# Patient Record
Sex: FEMALE | Race: WHITE | NOT HISPANIC OR LATINO | Employment: FULL TIME | URBAN - METROPOLITAN AREA
[De-identification: names, ages, dates, MRNs, and addresses within clinical notes are randomized per-mention and may not be internally consistent; named-entity substitution may affect disease eponyms.]

---

## 2023-12-14 ENCOUNTER — HOSPITAL ENCOUNTER (OUTPATIENT)
Dept: RADIOLOGY | Facility: HOSPITAL | Age: 48
Discharge: HOME | End: 2023-12-14
Payer: COMMERCIAL

## 2023-12-14 ENCOUNTER — OFFICE VISIT (OUTPATIENT)
Dept: PAIN MEDICINE | Facility: CLINIC | Age: 48
End: 2023-12-14
Payer: COMMERCIAL

## 2023-12-14 ENCOUNTER — LAB (OUTPATIENT)
Dept: LAB | Facility: LAB | Age: 48
End: 2023-12-14
Payer: COMMERCIAL

## 2023-12-14 VITALS
DIASTOLIC BLOOD PRESSURE: 85 MMHG | BODY MASS INDEX: 43.19 KG/M2 | HEART RATE: 91 BPM | RESPIRATION RATE: 16 BRPM | SYSTOLIC BLOOD PRESSURE: 195 MMHG | TEMPERATURE: 96.6 F | WEIGHT: 253 LBS | HEIGHT: 64 IN

## 2023-12-14 DIAGNOSIS — M54.16 LUMBAR RADICULOPATHY: ICD-10-CM

## 2023-12-14 DIAGNOSIS — E55.9 VITAMIN D DEFICIENCY, UNSPECIFIED: ICD-10-CM

## 2023-12-14 DIAGNOSIS — M54.2 CERVICALGIA: ICD-10-CM

## 2023-12-14 DIAGNOSIS — G89.4 CHRONIC PAIN SYNDROME: ICD-10-CM

## 2023-12-14 DIAGNOSIS — M17.10 ARTHRITIS OF KNEE: ICD-10-CM

## 2023-12-14 DIAGNOSIS — M54.2 CERVICALGIA: Primary | ICD-10-CM

## 2023-12-14 DIAGNOSIS — Z00.00 ENCOUNTER FOR GENERAL ADULT MEDICAL EXAMINATION WITHOUT ABNORMAL FINDINGS: Primary | ICD-10-CM

## 2023-12-14 LAB
25(OH)D3 SERPL-MCNC: 42 NG/ML (ref 30–100)
ALBUMIN SERPL BCP-MCNC: 4.1 G/DL (ref 3.4–5)
ALP SERPL-CCNC: 74 U/L (ref 33–110)
ALT SERPL W P-5'-P-CCNC: 18 U/L (ref 7–45)
ANION GAP SERPL CALC-SCNC: 12 MMOL/L (ref 10–20)
AST SERPL W P-5'-P-CCNC: 13 U/L (ref 9–39)
BASOPHILS # BLD AUTO: 0.03 X10*3/UL (ref 0–0.1)
BASOPHILS NFR BLD AUTO: 0.4 %
BILIRUB SERPL-MCNC: 0.3 MG/DL (ref 0–1.2)
BUN SERPL-MCNC: 17 MG/DL (ref 6–23)
CALCIUM SERPL-MCNC: 9.1 MG/DL (ref 8.6–10.3)
CHLORIDE SERPL-SCNC: 103 MMOL/L (ref 98–107)
CHOLEST SERPL-MCNC: 206 MG/DL (ref 0–199)
CHOLESTEROL/HDL RATIO: 3.5
CO2 SERPL-SCNC: 30 MMOL/L (ref 21–32)
CREAT SERPL-MCNC: 1.01 MG/DL (ref 0.5–1.05)
EOSINOPHIL # BLD AUTO: 0.29 X10*3/UL (ref 0–0.7)
EOSINOPHIL NFR BLD AUTO: 3.7 %
ERYTHROCYTE [DISTWIDTH] IN BLOOD BY AUTOMATED COUNT: 12.9 % (ref 11.5–14.5)
GFR SERPL CREATININE-BSD FRML MDRD: 69 ML/MIN/1.73M*2
GLUCOSE SERPL-MCNC: 81 MG/DL (ref 74–99)
HCT VFR BLD AUTO: 44.3 % (ref 36–46)
HDLC SERPL-MCNC: 59.6 MG/DL
HGB BLD-MCNC: 14.2 G/DL (ref 12–16)
IMM GRANULOCYTES # BLD AUTO: 0.04 X10*3/UL (ref 0–0.7)
IMM GRANULOCYTES NFR BLD AUTO: 0.5 % (ref 0–0.9)
LDLC SERPL CALC-MCNC: 112 MG/DL
LYMPHOCYTES # BLD AUTO: 2.41 X10*3/UL (ref 1.2–4.8)
LYMPHOCYTES NFR BLD AUTO: 30.4 %
MCH RBC QN AUTO: 29.6 PG (ref 26–34)
MCHC RBC AUTO-ENTMCNC: 32.1 G/DL (ref 32–36)
MCV RBC AUTO: 92 FL (ref 80–100)
MONOCYTES # BLD AUTO: 0.56 X10*3/UL (ref 0.1–1)
MONOCYTES NFR BLD AUTO: 7.1 %
NEUTROPHILS # BLD AUTO: 4.6 X10*3/UL (ref 1.2–7.7)
NEUTROPHILS NFR BLD AUTO: 57.9 %
NON HDL CHOLESTEROL: 146 MG/DL (ref 0–149)
NRBC BLD-RTO: 0 /100 WBCS (ref 0–0)
PLATELET # BLD AUTO: 364 X10*3/UL (ref 150–450)
POTASSIUM SERPL-SCNC: 4.2 MMOL/L (ref 3.5–5.3)
PROT SERPL-MCNC: 6.8 G/DL (ref 6.4–8.2)
RBC # BLD AUTO: 4.8 X10*6/UL (ref 4–5.2)
SODIUM SERPL-SCNC: 141 MMOL/L (ref 136–145)
TRIGL SERPL-MCNC: 172 MG/DL (ref 0–149)
TSH SERPL-ACNC: 1.83 MIU/L (ref 0.44–3.98)
VLDL: 34 MG/DL (ref 0–40)
WBC # BLD AUTO: 7.9 X10*3/UL (ref 4.4–11.3)

## 2023-12-14 PROCEDURE — 80061 LIPID PANEL: CPT

## 2023-12-14 PROCEDURE — 73564 X-RAY EXAM KNEE 4 OR MORE: CPT | Mod: LT

## 2023-12-14 PROCEDURE — 99214 OFFICE O/P EST MOD 30 MIN: CPT | Performed by: ANESTHESIOLOGY

## 2023-12-14 PROCEDURE — 84443 ASSAY THYROID STIM HORMONE: CPT

## 2023-12-14 PROCEDURE — 1036F TOBACCO NON-USER: CPT | Performed by: ANESTHESIOLOGY

## 2023-12-14 PROCEDURE — 85025 COMPLETE CBC W/AUTO DIFF WBC: CPT

## 2023-12-14 PROCEDURE — 36415 COLL VENOUS BLD VENIPUNCTURE: CPT

## 2023-12-14 PROCEDURE — 72114 X-RAY EXAM L-S SPINE BENDING: CPT | Performed by: RADIOLOGY

## 2023-12-14 PROCEDURE — 73564 X-RAY EXAM KNEE 4 OR MORE: CPT | Mod: LEFT SIDE | Performed by: RADIOLOGY

## 2023-12-14 PROCEDURE — 82306 VITAMIN D 25 HYDROXY: CPT

## 2023-12-14 PROCEDURE — 72114 X-RAY EXAM L-S SPINE BENDING: CPT | Mod: FY

## 2023-12-14 PROCEDURE — 80053 COMPREHEN METABOLIC PANEL: CPT

## 2023-12-14 RX ORDER — TOPIRAMATE 50 MG/1
TABLET, FILM COATED ORAL
COMMUNITY
Start: 2020-07-28 | End: 2024-01-10 | Stop reason: WASHOUT

## 2023-12-14 RX ORDER — DICLOFENAC SODIUM 50 MG/1
TABLET, DELAYED RELEASE ORAL
COMMUNITY
Start: 2021-02-05 | End: 2024-01-10 | Stop reason: WASHOUT

## 2023-12-14 RX ORDER — TOPIRAMATE 50 MG/1
50 TABLET, FILM COATED ORAL 2 TIMES DAILY
Qty: 180 TABLET | Refills: 4 | Status: SHIPPED | OUTPATIENT
Start: 2023-12-14 | End: 2023-12-20 | Stop reason: SDUPTHER

## 2023-12-14 RX ORDER — LORAZEPAM 2 MG/1
TABLET ORAL
COMMUNITY
Start: 2021-02-05 | End: 2023-12-21 | Stop reason: WASHOUT

## 2023-12-14 RX ORDER — MULTIVITAMIN/IRON/FOLIC ACID 18MG-0.4MG
TABLET ORAL
COMMUNITY
Start: 2020-07-28 | End: 2024-01-10 | Stop reason: WASHOUT

## 2023-12-14 RX ORDER — AMITRIPTYLINE HYDROCHLORIDE 10 MG/1
1 TABLET, FILM COATED ORAL NIGHTLY
COMMUNITY
Start: 2023-07-21 | End: 2024-01-10 | Stop reason: WASHOUT

## 2023-12-14 RX ORDER — ACETAMINOPHEN 160 MG/5ML
1 SUSPENSION, ORAL (FINAL DOSE FORM) ORAL 3 TIMES DAILY
COMMUNITY
Start: 2021-09-30 | End: 2024-01-10 | Stop reason: WASHOUT

## 2023-12-14 RX ORDER — HYDROCHLOROTHIAZIDE 12.5 MG/1
25 CAPSULE ORAL
COMMUNITY
Start: 2023-10-31 | End: 2024-01-15 | Stop reason: WASHOUT

## 2023-12-14 RX ORDER — GUAIFENESIN 600 MG/1
TABLET, EXTENDED RELEASE ORAL
COMMUNITY
Start: 2007-12-31 | End: 2024-01-10 | Stop reason: WASHOUT

## 2023-12-14 RX ORDER — PERPHENAZINE 16 MG
TABLET ORAL
Qty: 270 CAPSULE | Refills: 4 | Status: SHIPPED | OUTPATIENT
Start: 2023-12-14 | End: 2024-06-07 | Stop reason: ALTCHOICE

## 2023-12-14 RX ORDER — LISINOPRIL 10 MG/1
10 TABLET ORAL
COMMUNITY
Start: 2023-12-12 | End: 2024-01-15 | Stop reason: WASHOUT

## 2023-12-14 RX ORDER — VITAMIN B COMPLEX
1 CAPSULE ORAL 2 TIMES DAILY
Qty: 180 CAPSULE | Refills: 4 | Status: SHIPPED | OUTPATIENT
Start: 2023-12-14 | End: 2023-12-14

## 2023-12-14 RX ORDER — B-COMPLEX WITH VITAMIN C
TABLET ORAL
COMMUNITY
End: 2024-01-10 | Stop reason: WASHOUT

## 2023-12-14 RX ORDER — ACETAMINOPHEN 160 MG/5ML
200 SUSPENSION, ORAL (FINAL DOSE FORM) ORAL 3 TIMES DAILY
Qty: 270 CAPSULE | Refills: 4 | Status: SHIPPED | OUTPATIENT
Start: 2023-12-14 | End: 2023-12-20 | Stop reason: SDUPTHER

## 2023-12-14 RX ORDER — DICLOFENAC POTASSIUM 50 MG/1
50 TABLET, FILM COATED ORAL 3 TIMES DAILY
Qty: 270 TABLET | Refills: 4 | Status: SHIPPED | OUTPATIENT
Start: 2023-12-14 | End: 2024-01-04

## 2023-12-14 RX ORDER — VITAMIN B COMPLEX
1 CAPSULE ORAL 2 TIMES DAILY
Qty: 180 CAPSULE | Refills: 4 | Status: SHIPPED | OUTPATIENT
Start: 2023-12-14

## 2023-12-14 RX ORDER — ACETAMINOPHEN 160 MG/5ML
200 SUSPENSION, ORAL (FINAL DOSE FORM) ORAL 3 TIMES DAILY
COMMUNITY
Start: 2023-02-11 | End: 2024-01-10 | Stop reason: WASHOUT

## 2023-12-14 RX ORDER — ALBUTEROL SULFATE 90 UG/1
2 AEROSOL, METERED RESPIRATORY (INHALATION) EVERY 4 HOURS PRN
COMMUNITY
Start: 2007-08-02 | End: 2024-01-10 | Stop reason: WASHOUT

## 2023-12-14 RX ORDER — BUPROPION HYDROCHLORIDE 150 MG/1
450 TABLET ORAL
COMMUNITY
Start: 2023-05-02

## 2023-12-14 ASSESSMENT — ENCOUNTER SYMPTOMS
NEUROLOGICAL NEGATIVE: 1
HEMATOLOGIC/LYMPHATIC NEGATIVE: 1
PSYCHIATRIC NEGATIVE: 1
ENDOCRINE NEGATIVE: 1
ARTHRALGIAS: 1
RESPIRATORY NEGATIVE: 1
CARDIOVASCULAR NEGATIVE: 1
EYES NEGATIVE: 1
BACK PAIN: 1
GASTROINTESTINAL NEGATIVE: 1

## 2023-12-14 ASSESSMENT — PAIN SCALES - GENERAL
PAINLEVEL_OUTOF10: 6
PAINLEVEL: 6

## 2023-12-14 ASSESSMENT — PAIN - FUNCTIONAL ASSESSMENT: PAIN_FUNCTIONAL_ASSESSMENT: 0-10

## 2023-12-14 ASSESSMENT — PAIN DESCRIPTION - DESCRIPTORS: DESCRIPTORS: ACHING

## 2023-12-14 ASSESSMENT — LIFESTYLE VARIABLES: TOTAL SCORE: 2

## 2023-12-14 NOTE — PROGRESS NOTES
5/17/2023 addendum:  The knee x-ray showed mild arthritis in the knee which make her candidate for steroid injection if it does not work we will plan on Synvisc 1 injection.  Regarding the back she has spondylolisthesis at L4/L5 with some spondylosis and degenerative changes the patient may benefit from [place] angle L5-S1 epidural steroid injection    SUBJECTIVE:  This is 48 y.o.  female with PMH of morbid obesity BMI 43.4, KEVIN, hypertension, and fibromyalgia with multiple musculoskeletal complaints failed IV infusion therapy.  The patient is on Savella and cyclobenzaprine and doing very well with that never received a C6-7 interlaminar ROB who is here for follow-up now for left left leg pain does not know if it is knee or radiculopathy.  The patient has no x-rays for her knees or her back.  I ordered x-rays for her knees and her back.  The patient needs a refill on her medication and I refilled up her Savella 50 mg twice daily, topiramate 50 mg twice daily, diclofenac 50 mg with acetaminophen 1000 mg 3 times daily as needed, neuro supplements.  Will plan on knee or back injection according to the results of the x-rays.        Prior office visit:  10/8/2023: This is 45 year year old female who is back here today for follow-up stating that she is doing great on her Savella and supplement in addition to cyclobenzaprine. The patient could not afford the [facility] chiropractic and she had a contract with a chiropractor for a whole year she does treatments in addition she has medical massotherapy as well almost every week and she is very happy with the result as well. She is here just for her medication and supplement refill.         Last procedure:   IV infusion therapy the patient has had a 0% improvement in pain 0% functional improvement.     Portions of record reviewed for pertinent issues: active problem list, medication list, allergies, family history, social history, notes from last encounter, encounters, lab  results, imaging and other system records.     I have personally reviewed the OARRS report for this patient. This report is scanned into the electronic medical record. I have considered the risks of abuse, dependence, addiction and diversion. It showed: No controlled substance   showed no controlled substance  OPIOID RISK ASSESSMENT SCORE 3/26  Aberrant behavior: None        Employment/Diasbility: Full-time with a [employer]   Social Hx and education:  no children denies smoking drinking or use of illicit drugs, her  is musical , 2 years of college       Diagnostic studies:  7/28/2020 C-spine x-ray showed degenerative changes at C5-6        Review of Systems   HENT: Negative.     Eyes: Negative.    Respiratory: Negative.     Cardiovascular: Negative.    Gastrointestinal: Negative.    Endocrine: Negative.    Genitourinary: Negative.    Musculoskeletal:  Positive for arthralgias and back pain.        Left leg pain   Skin: Negative.    Neurological: Negative.    Hematological: Negative.    Psychiatric/Behavioral: Negative.        Physical Exam  Vitals and nursing note reviewed.   Constitutional:       Appearance: Normal appearance.   HENT:      Head: Normocephalic and atraumatic.      Nose: Nose normal.   Eyes:      Extraocular Movements: Extraocular movements intact.      Conjunctiva/sclera: Conjunctivae normal.      Pupils: Pupils are equal, round, and reactive to light.   Cardiovascular:      Rate and Rhythm: Normal rate and regular rhythm.      Pulses: Normal pulses.      Heart sounds: Normal heart sounds.   Pulmonary:      Effort: Pulmonary effort is normal.      Breath sounds: Normal breath sounds.   Abdominal:      General: Abdomen is flat. Bowel sounds are normal.      Palpations: Abdomen is soft.   Musculoskeletal:         General: Tenderness present.   Skin:     General: Skin is warm.   Neurological:      General: No focal deficit present.      Mental Status: She  is alert and oriented to person, place, and time.   Psychiatric:         Mood and Affect: Mood normal.         Behavior: Behavior normal.             Plan  At least 50% of the visit was involved in the discussion of the options for treatment. We discussed exercises, medication, interventional therapies and surgery. Healthy life style is essential with patient hard work to achieve the wellness. In addition; discussion with the patient and/or family about any of the diagnostic results, impressions and/or recommended diagnostic studies, prognosis, risks and benefits of treatment options, instructions for treatment and/or follow-up, importance of compliance with chosen treatment options, risk-factor reduction, and patient/family education.         Pool therapy, walking in the pool, at least 3x per week for 30 minutes  Continue self-directed physical therapy  Refill Savella 50 mg twice daily, topiramate 50 mg twice daily, diclofenac 50 mg with acetaminophen 1000 mg 3 times daily as needed, neuro supplements.  Left knee x-ray  Lumbar x-ray with flexion-extension  Healthy lifestyle and anti-inflammatory diet in addition to weight control discussed with the patient  Alternative chronic pain therapies was discussed, encouraged and information was handed  Return to Clinic after the x-rays     *Please note this report has been produced using speech recognition software and may contain errors related to that system including grammar, punctuation and spelling as well as words and phrases that may be inappropriate. If there are questions or concerns, please feel free to contact me to clarify.    Jose Lyons MD

## 2023-12-14 NOTE — PROGRESS NOTES
This is 48 y.o.  female with who has been treated for  fibromyalgia pain . Pain is better, The pain is described as achiness and is relieved by Medications patient takes Savella Topamax diclofenac   .Here for follow-up .  Chief Complaint   Patient presents with    Follow-up     Having left leg pain   However today the patient presents with left leg pain.  Started 6 weeks ago last 2 weeks it has been an bearable but she has also been out of her Topamax she is uncertain if the Two are related.    Pain Therapies:  medications    Opioid Risk Assessment Score 2/26 family history of alcohol abuse patient does have a history of depression.

## 2023-12-18 NOTE — PROGRESS NOTES
1/15/2024 addendum:  The patient had significant pain expressions during her lumbar epidural steroid injection at the bilateral L4-5.  The procedure was done successfully but I really do not think that the patient would respond to injections either since she failed the IV infusion therapy as well    SUBJECTIVE:  This is 48 y.o.  female with PMH of morbid obesity BMI 43.4, KEVIN, hypertension, and fibromyalgia with multiple musculoskeletal complaints failed IV infusion therapy.  The patient is on Savella 50 mg twice daily, topiramate 50 mg twice daily, diclofenac 50 mg with acetaminophen 1000 mg 3 times daily as needed in addition to a neuro supplement.  The patient had x-rays of her left knee x-ray showed moderate to severe medial compartment arthritis and lumbar x-ray showed degenerative changes at L4-5 and L5-S1 with spondylolisthesis L4/L5 stable on flexion extension may be she is candidate for L5-S1 shallow angle epidural steroid injection and steroid versus Synvisc injection of the knee  who is here for follow-up complaining mostly of her left knee pain in addition to her usual chronic lower back pain.  The patient had x-ray done on her knee showed significant decrease in the height of her medial compartment in addition to mild tricompartment arthritis.  We proceeded with injection in office today and refilled her medications      Prior office visit:  This is 48 y.o.  female with PMH of morbid obesity BMI 43.4, KEVIN, hypertension, and fibromyalgia with multiple musculoskeletal complaints failed IV infusion therapy.  The patient is on Savella and cyclobenzaprine and doing very well with that never received a C6-7 interlaminar ROB who is here for follow-up now for left left leg pain does not know if it is knee or radiculopathy.  The patient has no x-rays for her knees or her back.  I ordered x-rays for her knees and her back.  The patient needs a refill on her medication and I refilled up her Savella 50 mg twice  daily, topiramate 50 mg twice daily, diclofenac 50 mg with acetaminophen 1000 mg 3 times daily as needed, neuro supplements.  Will plan on knee or back injection according to the results of the x-rays.        Last procedure:   IV infusion therapy the patient has had a 0% improvement in pain 0% functional improvement.     Portions of record reviewed for pertinent issues: active problem list, medication list, allergies, family history, social history, notes from last encounter, encounters, lab results, imaging and other system records.     I have personally reviewed the OARRS report for this patient. This report is scanned into the electronic medical record. I have considered the risks of abuse, dependence, addiction and diversion. It showed: No controlled substance   showed no controlled substance  OPIOID RISK ASSESSMENT SCORE 3/26  Aberrant behavior: None        Employment/Diasbility: Full-time with a [employer]  Social Hx and education:  no children denies smoking drinking or use of illicit drugs, her  is musical , 2 years of college        Diagnostic studies:  12/14/2023 lumbar x-ray showed facet joint arthropathy L4-5 and L5-S1, Grade 1 L4/L5 anterolisthesis stable on flexion extension  12/14/2023 left knee x-ray showed moderate medial compartment compression and mild tricompartment arthritis  7/28/2020 C-spine x-ray showed degenerative changes at C5-6            Review of Systems   HENT: Negative.     Eyes: Negative.    Respiratory: Negative.     Cardiovascular: Negative.    Gastrointestinal: Negative.    Endocrine: Negative.    Genitourinary: Negative.    Musculoskeletal:  Positive for arthralgias and back pain.   Skin: Negative.    Neurological: Negative.    Hematological: Negative.    Psychiatric/Behavioral: Negative.        Physical Exam  Vitals and nursing note reviewed.   Constitutional:       Appearance: Normal appearance.   HENT:      Head: Normocephalic and  atraumatic.      Nose: Nose normal.   Eyes:      Extraocular Movements: Extraocular movements intact.      Conjunctiva/sclera: Conjunctivae normal.      Pupils: Pupils are equal, round, and reactive to light.   Cardiovascular:      Rate and Rhythm: Normal rate and regular rhythm.      Pulses: Normal pulses.      Heart sounds: Normal heart sounds.   Pulmonary:      Effort: Pulmonary effort is normal.      Breath sounds: Normal breath sounds.   Abdominal:      General: Abdomen is flat. Bowel sounds are normal.      Palpations: Abdomen is soft.   Musculoskeletal:         General: Tenderness present.   Skin:     General: Skin is warm.   Neurological:      General: No focal deficit present.      Mental Status: She is alert and oriented to person, place, and time.   Psychiatric:         Mood and Affect: Mood normal.         Behavior: Behavior normal.               Plan  At least 50% of the visit was involved in the discussion of the options for treatment. We discussed exercises, medication, interventional therapies and surgery. Healthy life style is essential with patient hard work to achieve the wellness. In addition; discussion with the patient and/or family about any of the diagnostic results, impressions and/or recommended diagnostic studies, prognosis, risks and benefits of treatment options, instructions for treatment and/or follow-up, importance of compliance with chosen treatment options, risk-factor reduction, and patient/family education.         Pool therapy, walking in the pool, at least 3x per week for 30 minutes  Continue self-directed physical therapy  Left knee steroid injection  Bilateral L5-S1 interlaminar ROB shallow angle  Healthy lifestyle and anti-inflammatory diet in addition to weight control discussed with the patient  Alternative chronic pain therapies was discussed, encouraged and information was handed  Return to Clinic 3 months     *Please note this report has been produced using speech  recognition software and may contain errors related to that system including grammar, punctuation and spelling as well as words and phrases that may be inappropriate. If there are questions or concerns, please feel free to contact me to clarify.      Procedure: Left knees injection.    Ready to learn, no apparent learning barriers.  Explained treatment plan. Pt. verbalized understanding. Following review of potential side effects and complications (including but not necessarily limited to infection, allergic reaction, local tissue breakdown, skin blanching, systemic side effects of corticosteroid's, elevation of blood glucose, injury to bodily tissues) they indicated they understood and agreed to proceed.    The procedure was carried out under sterile prep with Chlora-prep. The skin was infiltrated with lidocaine 1% using 25 ga 1.5 inche  needle was introduced and advanced into the lateral/caudal area of the patella into the ligament then the knee joint.Then the same needle 1.5 in needle was introduced at the same space, after negative aspiration a mixture of 3cc of 0.75% Bupivacaine and 40 mg of Kenalog  was distributed equally in the joint spaces.      Pt. Tolerated the procedure very well and had complete resolution of her symptoms.    Jose Lyons MD

## 2023-12-20 ENCOUNTER — OFFICE VISIT (OUTPATIENT)
Dept: PAIN MEDICINE | Facility: CLINIC | Age: 48
End: 2023-12-20
Payer: COMMERCIAL

## 2023-12-20 VITALS
SYSTOLIC BLOOD PRESSURE: 148 MMHG | TEMPERATURE: 96.4 F | DIASTOLIC BLOOD PRESSURE: 93 MMHG | HEIGHT: 64 IN | WEIGHT: 253.53 LBS | RESPIRATION RATE: 16 BRPM | BODY MASS INDEX: 43.28 KG/M2 | HEART RATE: 91 BPM

## 2023-12-20 DIAGNOSIS — M43.06 LUMBAR SPONDYLOLYSIS: Primary | ICD-10-CM

## 2023-12-20 DIAGNOSIS — M43.17 ACQUIRED SPONDYLOLISTHESIS OF LUMBOSACRAL REGION: ICD-10-CM

## 2023-12-20 DIAGNOSIS — M17.10 ARTHRITIS OF KNEE: ICD-10-CM

## 2023-12-20 DIAGNOSIS — G89.4 CHRONIC PAIN SYNDROME: ICD-10-CM

## 2023-12-20 DIAGNOSIS — M79.7 FIBROMYALGIA: ICD-10-CM

## 2023-12-20 DIAGNOSIS — M54.2 CERVICALGIA: ICD-10-CM

## 2023-12-20 DIAGNOSIS — M54.16 LUMBAR RADICULOPATHY: ICD-10-CM

## 2023-12-20 PROCEDURE — 1036F TOBACCO NON-USER: CPT | Performed by: ANESTHESIOLOGY

## 2023-12-20 PROCEDURE — 99214 OFFICE O/P EST MOD 30 MIN: CPT | Performed by: ANESTHESIOLOGY

## 2023-12-20 PROCEDURE — 2500000004 HC RX 250 GENERAL PHARMACY W/ HCPCS (ALT 636 FOR OP/ED): Performed by: ANESTHESIOLOGY

## 2023-12-20 PROCEDURE — 20610 DRAIN/INJ JOINT/BURSA W/O US: CPT | Performed by: ANESTHESIOLOGY

## 2023-12-20 PROCEDURE — 2500000005 HC RX 250 GENERAL PHARMACY W/O HCPCS: Performed by: ANESTHESIOLOGY

## 2023-12-20 RX ORDER — TOPIRAMATE 50 MG/1
50 TABLET, FILM COATED ORAL 2 TIMES DAILY
Qty: 180 TABLET | Refills: 4 | Status: SHIPPED | OUTPATIENT
Start: 2023-12-20 | End: 2024-01-10 | Stop reason: ALTCHOICE

## 2023-12-20 RX ORDER — PERPHENAZINE 16 MG
TABLET ORAL
Qty: 270 CAPSULE | Refills: 4 | Status: SHIPPED | OUTPATIENT
Start: 2023-12-20 | End: 2024-02-19 | Stop reason: SDUPTHER

## 2023-12-20 RX ORDER — ACETAMINOPHEN 160 MG/5ML
200 SUSPENSION, ORAL (FINAL DOSE FORM) ORAL 3 TIMES DAILY
Qty: 270 CAPSULE | Refills: 4 | Status: SHIPPED | OUTPATIENT
Start: 2023-12-20 | End: 2024-12-19

## 2023-12-20 RX ORDER — TRIAMCINOLONE ACETONIDE 40 MG/ML
40 INJECTION, SUSPENSION INTRA-ARTICULAR; INTRAMUSCULAR ONCE
Status: COMPLETED | OUTPATIENT
Start: 2023-12-20 | End: 2023-12-20

## 2023-12-20 RX ORDER — BUPIVACAINE HYDROCHLORIDE 7.5 MG/ML
5 INJECTION, SOLUTION EPIDURAL; RETROBULBAR ONCE
Status: COMPLETED | OUTPATIENT
Start: 2023-12-20 | End: 2023-12-20

## 2023-12-20 RX ADMIN — TRIAMCINOLONE ACETONIDE 40 MG: 40 INJECTION, SUSPENSION INTRA-ARTICULAR; INTRAMUSCULAR at 13:38

## 2023-12-20 RX ADMIN — BUPIVACAINE HYDROCHLORIDE 37.5 MG: 7.5 INJECTION, SOLUTION EPIDURAL; RETROBULBAR at 13:36

## 2023-12-20 ASSESSMENT — PATIENT HEALTH QUESTIONNAIRE - PHQ9
1. LITTLE INTEREST OR PLEASURE IN DOING THINGS: SEVERAL DAYS
SUM OF ALL RESPONSES TO PHQ9 QUESTIONS 1 AND 2: 2
2. FEELING DOWN, DEPRESSED OR HOPELESS: SEVERAL DAYS

## 2023-12-20 ASSESSMENT — PAIN SCALES - GENERAL
PAINLEVEL: 5
PAINLEVEL_OUTOF10: 5 - MODERATE PAIN

## 2023-12-20 ASSESSMENT — ENCOUNTER SYMPTOMS
LOSS OF SENSATION IN FEET: 1
OCCASIONAL FEELINGS OF UNSTEADINESS: 1
DEPRESSION: 1

## 2023-12-20 ASSESSMENT — PAIN - FUNCTIONAL ASSESSMENT: PAIN_FUNCTIONAL_ASSESSMENT: 0-10

## 2023-12-20 ASSESSMENT — PAIN DESCRIPTION - DESCRIPTORS: DESCRIPTORS: ACHING

## 2023-12-20 NOTE — PROGRESS NOTES
This is 48 y.o.  female with who has been treated for  Knee pain . Pain is unchanged, The pain is described as achiness and is relieved by nothing,  who is here for follow-up   Chief Complaint   Patient presents with    Follow-up    Knee Pain     5/10 L knee pain       Pain Therapies:  L knee X-ray follow up .

## 2023-12-21 ASSESSMENT — ENCOUNTER SYMPTOMS
CARDIOVASCULAR NEGATIVE: 1
ENDOCRINE NEGATIVE: 1
EYES NEGATIVE: 1
HEMATOLOGIC/LYMPHATIC NEGATIVE: 1
RESPIRATORY NEGATIVE: 1
PSYCHIATRIC NEGATIVE: 1
GASTROINTESTINAL NEGATIVE: 1
NEUROLOGICAL NEGATIVE: 1
BACK PAIN: 1
ARTHRALGIAS: 1

## 2023-12-30 DIAGNOSIS — G89.4 CHRONIC PAIN SYNDROME: ICD-10-CM

## 2023-12-30 DIAGNOSIS — M54.2 CERVICALGIA: ICD-10-CM

## 2023-12-30 DIAGNOSIS — M54.16 LUMBAR RADICULOPATHY: ICD-10-CM

## 2023-12-30 DIAGNOSIS — M17.10 ARTHRITIS OF KNEE: ICD-10-CM

## 2024-01-03 ENCOUNTER — TELEPHONE (OUTPATIENT)
Dept: PAIN MEDICINE | Facility: CLINIC | Age: 49
End: 2024-01-03

## 2024-01-04 RX ORDER — DICLOFENAC POTASSIUM 50 MG/1
TABLET, FILM COATED ORAL
Qty: 90 TABLET | Refills: 11 | Status: SHIPPED | OUTPATIENT
Start: 2024-01-04

## 2024-01-05 DIAGNOSIS — M54.16 LUMBAR RADICULOPATHY: Primary | ICD-10-CM

## 2024-01-05 NOTE — H&P (VIEW-ONLY)
Order for bilateral L5-S1 interlaminar ROB shallow angle to treat the stenosis and spondylolisthesis at L4-5

## 2024-01-10 ENCOUNTER — OFFICE VISIT (OUTPATIENT)
Dept: OBSTETRICS AND GYNECOLOGY | Facility: CLINIC | Age: 49
End: 2024-01-10
Payer: COMMERCIAL

## 2024-01-10 VITALS
WEIGHT: 252 LBS | SYSTOLIC BLOOD PRESSURE: 120 MMHG | DIASTOLIC BLOOD PRESSURE: 84 MMHG | BODY MASS INDEX: 43.02 KG/M2 | HEIGHT: 64 IN

## 2024-01-10 DIAGNOSIS — Z12.31 VISIT FOR SCREENING MAMMOGRAM: ICD-10-CM

## 2024-01-10 DIAGNOSIS — Z01.419 WELL FEMALE EXAM WITH ROUTINE GYNECOLOGICAL EXAM: ICD-10-CM

## 2024-01-10 PROCEDURE — 88175 CYTOPATH C/V AUTO FLUID REDO: CPT

## 2024-01-10 PROCEDURE — 87624 HPV HI-RISK TYP POOLED RSLT: CPT

## 2024-01-10 PROCEDURE — 1036F TOBACCO NON-USER: CPT | Performed by: OBSTETRICS & GYNECOLOGY

## 2024-01-10 PROCEDURE — 99396 PREV VISIT EST AGE 40-64: CPT | Performed by: OBSTETRICS & GYNECOLOGY

## 2024-01-10 RX ORDER — LISINOPRIL AND HYDROCHLOROTHIAZIDE 20; 25 MG/1; MG/1
TABLET ORAL
COMMUNITY
Start: 2023-12-26 | End: 2024-06-07 | Stop reason: SDUPTHER

## 2024-01-10 NOTE — PROGRESS NOTES
Subjective   Patient ID: Theodora Montez is a 48 y.o. female who presents for Annual Exam.    Last pap: 10/30/2020 normal HPV-  Last mamm: ordered for this year  LMP:  12/26/2023  Contraception: none  Sexually active: no  Self breast exam: yes  Diet: not very balanced  Exercise: does not exercise but walks a lot at work      HPI  Annual exam   Doing well. No complaints.       Review of Systems  Neg   Objective   Physical Exam    Physical Exam         Appearance: Normal appearance. Affect normal and alert  Pulmonary:      Effort: Pulmonary effort is normal. Breath sounds clear  Skin: no rashes or lesions   Breasts:     Breasts bilaterally are symmetrical. No masses or axillary adenopathy. No skin or nipple changes    Abdominal:     Abdomen is flat, soft, nontender. No distension. No mass palpated.      Genitourinary:     Labia: no skin lesions or rash       Urethra: No lesions.      Bladder with no prolapse     Vagina: No discharge, mucosa is pink with no lesions.     Cervix:    mobile and nontender no discharge     Uterus:   Not enlarged, small, nontender.      Adnexa: Bilaterally with  No mass or tenderness.            Extremities:  Nontender, no edema. Normal range of motion    Assessment/Plan   Diagnoses and all orders for this visit:  Well female exam with routine gynecological exam  Visit for screening mammogram    MD Margot Adams CMA 01/10/24 3:34 PM

## 2024-01-14 RX ORDER — PREDNISONE 10 MG/1
TABLET ORAL
Status: CANCELLED | OUTPATIENT
Start: 2024-01-14

## 2024-01-15 ENCOUNTER — APPOINTMENT (OUTPATIENT)
Dept: PAIN MEDICINE | Facility: CLINIC | Age: 49
End: 2024-01-15
Payer: COMMERCIAL

## 2024-01-15 ENCOUNTER — ANCILLARY PROCEDURE (OUTPATIENT)
Dept: RADIOLOGY | Facility: CLINIC | Age: 49
End: 2024-01-15
Payer: COMMERCIAL

## 2024-01-15 ENCOUNTER — HOSPITAL ENCOUNTER (OUTPATIENT)
Dept: PAIN MEDICINE | Facility: CLINIC | Age: 49
Discharge: HOME | End: 2024-01-15
Payer: COMMERCIAL

## 2024-01-15 VITALS
SYSTOLIC BLOOD PRESSURE: 148 MMHG | DIASTOLIC BLOOD PRESSURE: 87 MMHG | HEART RATE: 105 BPM | RESPIRATION RATE: 16 BRPM | TEMPERATURE: 95.7 F | OXYGEN SATURATION: 100 %

## 2024-01-15 DIAGNOSIS — M54.16 LUMBAR RADICULOPATHY: ICD-10-CM

## 2024-01-15 LAB — PREGNANCY TEST URINE, POC: NEGATIVE

## 2024-01-15 PROCEDURE — 77003 FLUOROGUIDE FOR SPINE INJECT: CPT

## 2024-01-15 PROCEDURE — 62323 NJX INTERLAMINAR LMBR/SAC: CPT | Performed by: ANESTHESIOLOGY

## 2024-01-15 PROCEDURE — A4216 STERILE WATER/SALINE, 10 ML: HCPCS

## 2024-01-15 PROCEDURE — 2500000005 HC RX 250 GENERAL PHARMACY W/O HCPCS

## 2024-01-15 PROCEDURE — 2500000004 HC RX 250 GENERAL PHARMACY W/ HCPCS (ALT 636 FOR OP/ED)

## 2024-01-15 RX ORDER — METHYLPREDNISOLONE ACETATE 40 MG/ML
INJECTION, SUSPENSION INTRA-ARTICULAR; INTRALESIONAL; INTRAMUSCULAR; SOFT TISSUE
Status: COMPLETED
Start: 2024-01-15 | End: 2024-01-15

## 2024-01-15 RX ORDER — AMITRIPTYLINE HYDROCHLORIDE 10 MG/1
10 TABLET, FILM COATED ORAL NIGHTLY
COMMUNITY

## 2024-01-15 RX ORDER — SODIUM CHLORIDE 9 MG/ML
INJECTION, SOLUTION INTRAMUSCULAR; INTRAVENOUS; SUBCUTANEOUS
Status: COMPLETED
Start: 2024-01-15 | End: 2024-01-15

## 2024-01-15 RX ORDER — LIDOCAINE HYDROCHLORIDE 5 MG/ML
INJECTION, SOLUTION INFILTRATION; INTRAVENOUS
Status: COMPLETED
Start: 2024-01-15 | End: 2024-01-15

## 2024-01-15 RX ADMIN — METHYLPREDNISOLONE ACETATE 40 MG: 40 INJECTION, SUSPENSION INTRA-ARTICULAR; INTRALESIONAL; INTRAMUSCULAR; INTRASYNOVIAL; SOFT TISSUE at 10:39

## 2024-01-15 RX ADMIN — SODIUM CHLORIDE 10 ML: 9 INJECTION, SOLUTION INTRAMUSCULAR; INTRAVENOUS; SUBCUTANEOUS at 10:40

## 2024-01-15 RX ADMIN — LIDOCAINE HYDROCHLORIDE 250 MG: 5 INJECTION, SOLUTION INFILTRATION at 10:41

## 2024-01-15 ASSESSMENT — COLUMBIA-SUICIDE SEVERITY RATING SCALE - C-SSRS
6. HAVE YOU EVER DONE ANYTHING, STARTED TO DO ANYTHING, OR PREPARED TO DO ANYTHING TO END YOUR LIFE?: NO
2. HAVE YOU ACTUALLY HAD ANY THOUGHTS OF KILLING YOURSELF?: NO
1. IN THE PAST MONTH, HAVE YOU WISHED YOU WERE DEAD OR WISHED YOU COULD GO TO SLEEP AND NOT WAKE UP?: NO

## 2024-01-15 ASSESSMENT — PAIN SCALES - GENERAL: PAINLEVEL_OUTOF10: 5 - MODERATE PAIN

## 2024-01-15 ASSESSMENT — PAIN - FUNCTIONAL ASSESSMENT: PAIN_FUNCTIONAL_ASSESSMENT: 0-10

## 2024-01-15 NOTE — Clinical Note
Prepped with ChloraPrep, a minimum of 3 minute dry time, longer if needed, no pooling noted, patient draped in sterile fashion.

## 2024-01-15 NOTE — OP NOTE
Patient has no changes in health medical management or allergies since last visit on 12/19/2023    Preop diagnosis: Lumbar spondylosis with radiculopathy  Postoperative diagnosis: The same    PROCEDURE bilateral L5-S1  Lumbar Interlaminar Epidural    Estimated blood loss: None  Complication: None     CLINICAL NOTE: The patient is a pleasant 48 y.o.  female with a significant history of bilateral lower back pain with claudication/radiculopathy.  Physical exam and radiological findings correlate with the pre-operative diagnoses.  Because of these findings we elected to proceed with transforaminal epidural steroid injection at the above level (s).      PROCEDURE: After sufficient consent was signed, the patient was brought to the Operating Room, and placed in a prone position with a pillow underneath the hip. The patient's lumbar spine was in a flexed position. The lumbar area was then prepped and draped in the usual fashion.     Under fluoroscopic guidance, the rightL5-S1 interspace was identified. The skin at the entry site was infiltrated with 1% Lidocaine using a size 25 gauge needle. A size Tomary annle 18-gauge, 5 inch  was introduced gradually until the inferior lamina of the interspace was encountered. The needle was gradually advanced until it entered into the ligament. At that time loss of resistance technique was used, and the needle advanced gradually until the negative pressure was noticed in the epidural space. The bevel of the needle was placed laterally, and 3 cc of Omnipaque 300 were injected. This revealed excellent distribution of the dye in the epidural space, and around the nerve root of the affected side of the lumbar spine. At that time, a mixture of Depo-Medrol 80 mg Lidocaine 30  mg and Normal Saline for a total volume of 10 cc were injected . The needle was flushed and removed.     The patient tolerated the procedure very well and was transferred to the Recovery Room in stable condition.  The  patient had stable resolution of symptoms.  Patient to follow-up in the Pain Management Clinic as scheduled.

## 2024-01-17 ENCOUNTER — TELEPHONE (OUTPATIENT)
Dept: INFUSION THERAPY | Facility: CLINIC | Age: 49
End: 2024-01-17
Payer: COMMERCIAL

## 2024-01-17 NOTE — TELEPHONE ENCOUNTER
[facility] asked to verify celebrex allergy and potential cross sensitivity with med diclofenac.  Patient returned call has been taking med diclofenac for 3 years with no issues.  Will forward info to [facility] via fax.

## 2024-01-24 LAB
CYTOLOGY CMNT CVX/VAG CYTO-IMP: NORMAL
HPV HR 12 DNA GENITAL QL NAA+PROBE: NEGATIVE
HPV HR GENOTYPES PNL CVX NAA+PROBE: NEGATIVE
HPV16 DNA SPEC QL NAA+PROBE: NEGATIVE
HPV18 DNA SPEC QL NAA+PROBE: NEGATIVE
LAB AP HPV GENOTYPE QUESTION: YES
LAB AP HPV HR: NORMAL
LABORATORY COMMENT REPORT: NORMAL
PATH REPORT.TOTAL CANCER: NORMAL

## 2024-02-19 ENCOUNTER — OFFICE VISIT (OUTPATIENT)
Dept: PRIMARY CARE | Facility: CLINIC | Age: 49
End: 2024-02-19
Payer: COMMERCIAL

## 2024-02-19 VITALS
WEIGHT: 243 LBS | HEIGHT: 64 IN | DIASTOLIC BLOOD PRESSURE: 72 MMHG | SYSTOLIC BLOOD PRESSURE: 106 MMHG | HEART RATE: 87 BPM | OXYGEN SATURATION: 98 % | BODY MASS INDEX: 41.48 KG/M2

## 2024-02-19 DIAGNOSIS — M25.562 CHRONIC PAIN OF LEFT KNEE: ICD-10-CM

## 2024-02-19 DIAGNOSIS — Z12.31 VISIT FOR SCREENING MAMMOGRAM: ICD-10-CM

## 2024-02-19 DIAGNOSIS — I10 PRIMARY HYPERTENSION: ICD-10-CM

## 2024-02-19 DIAGNOSIS — Z12.11 SCREENING FOR COLON CANCER: Primary | ICD-10-CM

## 2024-02-19 DIAGNOSIS — G89.29 CHRONIC PAIN OF LEFT KNEE: ICD-10-CM

## 2024-02-19 DIAGNOSIS — R20.2 PARESTHESIAS: ICD-10-CM

## 2024-02-19 DIAGNOSIS — Z00.00 HEALTHCARE MAINTENANCE: ICD-10-CM

## 2024-02-19 PROBLEM — E66.01 OBESITY, MORBID, BMI 40.0-49.9 (MULTI): Status: ACTIVE | Noted: 2024-02-19

## 2024-02-19 PROBLEM — M17.10 ARTHRITIS OF KNEE: Status: ACTIVE | Noted: 2024-02-19

## 2024-02-19 PROBLEM — Z86.69 HISTORY OF MIGRAINE: Status: RESOLVED | Noted: 2024-02-19 | Resolved: 2024-02-19

## 2024-02-19 PROBLEM — M43.00 SPONDYLOLYSIS: Status: ACTIVE | Noted: 2024-02-19

## 2024-02-19 PROBLEM — Z86.2 HISTORY OF BLOOD DISORDER: Status: RESOLVED | Noted: 2024-02-19 | Resolved: 2024-02-19

## 2024-02-19 PROBLEM — M43.28 ANKYLOSIS OF SACROILIAC JOINT: Status: ACTIVE | Noted: 2024-02-19

## 2024-02-19 PROBLEM — E66.01 MORBID OBESITY (MULTI): Status: ACTIVE | Noted: 2024-02-19

## 2024-02-19 PROBLEM — R63.2 POLYPHAGIA: Status: RESOLVED | Noted: 2024-02-19 | Resolved: 2024-02-19

## 2024-02-19 PROBLEM — B96.89 BACTERIAL VAGINOSIS: Status: RESOLVED | Noted: 2024-02-19 | Resolved: 2024-02-19

## 2024-02-19 PROBLEM — M99.03 LUMBOSACRAL DYSFUNCTION: Status: RESOLVED | Noted: 2024-02-19 | Resolved: 2024-02-19

## 2024-02-19 PROBLEM — J30.2 SEASONAL ALLERGIES: Status: ACTIVE | Noted: 2024-02-19

## 2024-02-19 PROBLEM — R79.89 ELEVATED SERUM CREATININE: Status: ACTIVE | Noted: 2024-02-19

## 2024-02-19 PROBLEM — J02.9 SORE THROAT: Status: RESOLVED | Noted: 2024-02-19 | Resolved: 2024-02-19
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
## 2024-02-19 PROBLEM — N95.1 HOT FLASH, MENOPAUSAL: Status: ACTIVE | Noted: 2024-02-19

## 2024-02-19 PROBLEM — M43.10 ACQUIRED SPONDYLOLISTHESIS: Status: RESOLVED | Noted: 2024-02-19 | Resolved: 2024-02-19

## 2024-02-19 PROBLEM — M99.09 SEGMENTAL AND SOMATIC DYSFUNCTION: Status: ACTIVE | Noted: 2024-02-19

## 2024-02-19 PROBLEM — M50.33 OTHER CERVICAL DISC DEGENERATION, CERVICOTHORACIC REGION: Status: ACTIVE | Noted: 2024-02-19

## 2024-02-19 PROBLEM — M54.2 NECK PAIN: Status: RESOLVED | Noted: 2024-02-19 | Resolved: 2024-02-19

## 2024-02-19 PROBLEM — N76.0 BACTERIAL VAGINOSIS: Status: RESOLVED | Noted: 2024-02-19 | Resolved: 2024-02-19

## 2024-02-19 PROBLEM — F33.40 RECURRENT MAJOR DEPRESSION IN REMISSION (CMS-HCC): Status: RESOLVED | Noted: 2021-11-11 | Resolved: 2024-02-19

## 2024-02-19 PROBLEM — M79.7 FIBROMYALGIA: Status: ACTIVE | Noted: 2021-11-11

## 2024-02-19 PROBLEM — R63.2 POLYPHAGIA: Status: ACTIVE | Noted: 2024-02-19

## 2024-02-19 PROBLEM — G47.33 OSA (OBSTRUCTIVE SLEEP APNEA): Status: ACTIVE | Noted: 2024-02-19

## 2024-02-19 PROBLEM — M99.01 CERVICOTHORACIC SOMATIC DYSFUNCTION: Status: ACTIVE | Noted: 2024-02-19

## 2024-02-19 PROBLEM — F32.A DEPRESSION: Status: ACTIVE | Noted: 2024-02-19

## 2024-02-19 PROBLEM — M54.6 THORACIC BACK PAIN: Status: ACTIVE | Noted: 2024-02-19

## 2024-02-19 PROBLEM — N94.6 DYSMENORRHEA: Status: ACTIVE | Noted: 2024-02-19

## 2024-02-19 PROBLEM — G89.4 CHRONIC PAIN SYNDROME: Status: ACTIVE | Noted: 2024-02-19

## 2024-02-19 PROBLEM — J06.9 ACUTE URI: Status: RESOLVED | Noted: 2024-02-19 | Resolved: 2024-02-19

## 2024-02-19 PROBLEM — G54.0 NEUROGENIC THORACIC OUTLET SYNDROME: Status: ACTIVE | Noted: 2024-02-19

## 2024-02-19 PROBLEM — R29.898 WEAKNESS OF BOTH LOWER EXTREMITIES: Status: ACTIVE | Noted: 2024-02-19

## 2024-02-19 PROCEDURE — 99204 OFFICE O/P NEW MOD 45 MIN: CPT | Performed by: INTERNAL MEDICINE

## 2024-02-19 PROCEDURE — 3074F SYST BP LT 130 MM HG: CPT | Performed by: INTERNAL MEDICINE

## 2024-02-19 PROCEDURE — 3078F DIAST BP <80 MM HG: CPT | Performed by: INTERNAL MEDICINE

## 2024-02-19 PROCEDURE — 1036F TOBACCO NON-USER: CPT | Performed by: INTERNAL MEDICINE

## 2024-02-19 RX ORDER — CETIRIZINE HYDROCHLORIDE 10 MG/1
TABLET ORAL
COMMUNITY

## 2024-02-19 RX ORDER — BUPROPION HYDROCHLORIDE 300 MG/1
300 TABLET ORAL DAILY
COMMUNITY

## 2024-02-19 RX ORDER — CHOLECALCIFEROL (VITAMIN D3) 50 MCG
2000 TABLET ORAL
COMMUNITY
Start: 2018-04-19

## 2024-02-19 NOTE — PROGRESS NOTES
1/15/2024 addendum:  The patient had significant pain expressions during her lumbar epidural steroid injection at the bilateral L4-5.  The procedure was done successfully but I really do not think that the patient would respond to injections either since she failed the IV infusion therapy as well       SUBJECTIVE:  This is 48 y.o.  female with PMH of morbid obesity BMI 43.4, KEVIN, hypertension, and fibromyalgia with multiple musculoskeletal complaints failed IV infusion therapy, The patient had significant pain expressions during her lumbar epidural steroid injection at the bilateral L4-5. The procedure was done successfully but I really do not think that the patient would respond to injections either since she failed the IV infusion therapy as well  who is here for follow-up ***      Prior office visit:  12/20/2023: This is 48 y.o.  female with PMH of morbid obesity BMI 43.4, KEVIN, hypertension, and fibromyalgia with multiple musculoskeletal complaints failed IV infusion therapy.  The patient is on Savella 50 mg twice daily, topiramate 50 mg twice daily, diclofenac 50 mg with acetaminophen 1000 mg 3 times daily as needed in addition to a neuro supplement.  The patient had x-rays of her left knee x-ray showed moderate to severe medial compartment arthritis and lumbar x-ray showed degenerative changes at L4-5 and L5-S1 with spondylolisthesis L4/L5 stable on flexion extension may be she is candidate for L5-S1 shallow angle epidural steroid injection and steroid versus Synvisc injection of the knee  who is here for follow-up complaining mostly of her left knee pain in addition to her usual chronic lower back pain.  The patient had x-ray done on her knee showed significant decrease in the height of her medial compartment in addition to mild tricompartment arthritis.  We proceeded with injection in office today and refilled her medications        Prior office visit:  This is 48 y.o.  female with PMH of morbid obesity BMI  43.4, KEVIN, hypertension, and fibromyalgia with multiple musculoskeletal complaints failed IV infusion therapy.  The patient is on Savella and cyclobenzaprine and doing very well with that never received a C6-7 interlaminar ROB who is here for follow-up now for left left leg pain does not know if it is knee or radiculopathy.  The patient has no x-rays for her knees or her back.  I ordered x-rays for her knees and her back.  The patient needs a refill on her medication and I refilled up her Savella 50 mg twice daily, topiramate 50 mg twice daily, diclofenac 50 mg with acetaminophen 1000 mg 3 times daily as needed, neuro supplements.  Will plan on knee or back injection according to the results of the x-rays.        Last procedure:   IV infusion therapy the patient has had a 0% improvement in pain 0% functional improvement.     Portions of record reviewed for pertinent issues: active problem list, medication list, allergies, family history, social history, notes from last encounter, encounters, lab results, imaging and other system records.     I have personally reviewed the OARRS report for this patient. This report is scanned into the electronic medical record. I have considered the risks of abuse, dependence, addiction and diversion. 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  Social Hx and education:  no children denies smoking drinking or use of illicit drugs, her  is musical , 2 years of college        Diagnostic studies:  12/14/2023 lumbar x-ray showed facet joint arthropathy L4-5 and L5-S1, Grade 1 L4/L5 anterolisthesis stable on flexion extension  12/14/2023 left knee x-ray showed moderate medial compartment compression and mild tricompartment arthritis  7/28/2020 C-spine x-ray showed degenerative changes at C5-6               Procedures:  *** the patient has had a ***% improvement in pain and function      Portions of record reviewed for pertinent issues: active problem list, medication list, allergies, family history, social history, notes from last encounter, encounters, lab results, imaging and other available records.      I have personally reviewed the OARRS report for this patient. This report is scanned into the electronic medical record. I have considered the risks of abuse, dependence, addiction and diversion. It showed: ***  OPIOID RISK ASSESSMENT SCORE ***/26  Opioid agreement: ***  Activities of daily living: ***  Adverse effects: ***  Analgesia: W/O: ***/10, W ***/10    Toxicology screen: ***  Aberrant behavior: ***        Review of Systems     Physical Exam                 Plan  At least 50% of the visit was involved in the discussion of the options for treatment. We discussed exercises, medication, interventional therapies and surgery. Healthy life style is essential with patient hard work to achieve the wellness. In addition; discussion with the patient and/or family about any of the diagnostic results, impressions and/or recommended diagnostic studies, prognosis, risks and benefits of treatment options, instructions for treatment and/or follow-up, importance of compliance with chosen treatment options, risk-factor reduction, and patient/family education.         Pool therapy, walking in the pool, at least 3x per week for 30 minutes  Continue self-directed physical therapy  *** Smoking cessation  Healthy lifestyle and anti-inflammatory diet in addition to weight control discussed with the patient  Alternative chronic pain therapies was discussed, encouraged and information was handed  Return to Clinic ***     *Please note this report has been produced using speech recognition software and may contain errors related to that system including grammar, punctuation and spelling as well as words and phrases that may be  inappropriate. If there are questions or concerns, please feel free to contact me to clarify.    Jose Lyons MD
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
## 2024-02-20 ENCOUNTER — OFFICE VISIT (OUTPATIENT)
Dept: ORTHOPEDIC SURGERY | Facility: CLINIC | Age: 49
End: 2024-02-20
Payer: COMMERCIAL

## 2024-02-20 ENCOUNTER — APPOINTMENT (OUTPATIENT)
Dept: PAIN MEDICINE | Facility: CLINIC | Age: 49
End: 2024-02-20
Payer: COMMERCIAL

## 2024-02-20 VITALS — WEIGHT: 243 LBS | BODY MASS INDEX: 41.48 KG/M2 | HEIGHT: 64 IN

## 2024-02-20 DIAGNOSIS — S83.242A TEAR OF MEDIAL MENISCUS OF LEFT KNEE, CURRENT, UNSPECIFIED TEAR TYPE, INITIAL ENCOUNTER: Primary | ICD-10-CM

## 2024-02-20 PROCEDURE — 1036F TOBACCO NON-USER: CPT | Performed by: ORTHOPAEDIC SURGERY

## 2024-02-20 PROCEDURE — 99203 OFFICE O/P NEW LOW 30 MIN: CPT | Performed by: ORTHOPAEDIC SURGERY

## 2024-02-20 ASSESSMENT — PAIN DESCRIPTION - DESCRIPTORS: DESCRIPTORS: DISCOMFORT;SORE;ACHING

## 2024-02-20 ASSESSMENT — PAIN SCALES - GENERAL: PAINLEVEL_OUTOF10: 3

## 2024-02-20 ASSESSMENT — PAIN - FUNCTIONAL ASSESSMENT: PAIN_FUNCTIONAL_ASSESSMENT: 0-10

## 2024-02-20 NOTE — PROGRESS NOTES
Chief complaint is medial left knee pain that is intermittent and sometimes severe it swells feels like something is locking or catching she has had a steroid injection with limited relief she is also treated with pain management for fibromyalgia  Her symptoms have been present for quite some time she has tried a home exercise program and she currently is on anti-inflammatory medications  Past medical,family and social histories have been reviewed and are up to date.  All other body systems have been reviewed and are negative for complaint.  Constitutional: Well-developed well-nourished   Eyes: Sclerae anicteric, pupils equal and round  HENT: Normocephalic atraumatic  Cardiovascular: Pulses full, regular rate and rhythm  Respiratory: Breathing not labored, no wheezing  Integumentary: Skin intact, no lesions or rashes  Neurological: Sensation intact, no gross strength deficits, reflexes equal  Psychiatric: Alert oriented and appropriate  Hematologic/lymphatic: No lymphadenopathy  Left knee: Tender medial joint line, small effusion, positive Nunes's, full range of motion, no instability  X-rays show mild degenerative changes assessment persistent mechanical symptoms is failed conservative therapy recommend MRI to assess meniscal pathology  Also recommended weight loss

## 2024-02-27 ENCOUNTER — APPOINTMENT (OUTPATIENT)
Dept: PAIN MEDICINE | Facility: CLINIC | Age: 49
End: 2024-02-27
Payer: COMMERCIAL

## 2024-02-29 ENCOUNTER — HOSPITAL ENCOUNTER (OUTPATIENT)
Dept: RADIOLOGY | Facility: CLINIC | Age: 49
Discharge: HOME | End: 2024-02-29
Payer: COMMERCIAL

## 2024-02-29 ENCOUNTER — OFFICE VISIT (OUTPATIENT)
Dept: PAIN MEDICINE | Facility: CLINIC | Age: 49
End: 2024-02-29
Payer: COMMERCIAL

## 2024-02-29 VITALS
DIASTOLIC BLOOD PRESSURE: 69 MMHG | WEIGHT: 242.4 LBS | HEART RATE: 92 BPM | RESPIRATION RATE: 14 BRPM | TEMPERATURE: 97.5 F | SYSTOLIC BLOOD PRESSURE: 114 MMHG | HEIGHT: 62 IN | BODY MASS INDEX: 44.61 KG/M2

## 2024-02-29 DIAGNOSIS — M43.16 SPONDYLOLISTHESIS AT L4-L5 LEVEL: Primary | ICD-10-CM

## 2024-02-29 DIAGNOSIS — M54.16 SPINAL STENOSIS OF LUMBAR REGION WITH RADICULOPATHY: ICD-10-CM

## 2024-02-29 DIAGNOSIS — M48.061 SPINAL STENOSIS OF LUMBAR REGION WITH RADICULOPATHY: ICD-10-CM

## 2024-02-29 DIAGNOSIS — Z12.31 VISIT FOR SCREENING MAMMOGRAM: ICD-10-CM

## 2024-02-29 PROCEDURE — 1036F TOBACCO NON-USER: CPT | Performed by: ANESTHESIOLOGY

## 2024-02-29 PROCEDURE — 77067 SCR MAMMO BI INCL CAD: CPT

## 2024-02-29 PROCEDURE — 3074F SYST BP LT 130 MM HG: CPT | Performed by: ANESTHESIOLOGY

## 2024-02-29 PROCEDURE — 77067 SCR MAMMO BI INCL CAD: CPT | Performed by: RADIOLOGY

## 2024-02-29 PROCEDURE — 99214 OFFICE O/P EST MOD 30 MIN: CPT | Performed by: ANESTHESIOLOGY

## 2024-02-29 PROCEDURE — 77063 BREAST TOMOSYNTHESIS BI: CPT | Performed by: RADIOLOGY

## 2024-02-29 PROCEDURE — 3078F DIAST BP <80 MM HG: CPT | Performed by: ANESTHESIOLOGY

## 2024-02-29 ASSESSMENT — PAIN SCALES - GENERAL: PAINLEVEL: 6

## 2024-02-29 ASSESSMENT — ENCOUNTER SYMPTOMS
OCCASIONAL FEELINGS OF UNSTEADINESS: 1
DEPRESSION: 1
LOSS OF SENSATION IN FEET: 1

## 2024-02-29 NOTE — PROGRESS NOTES
4/22/2024 addendum:  4/22/2024 MRI of the lumbar spine was performed did not show any bone marrow edema or endplate changes but there is significant facet hypertrophy in addition to synovial cyst from the left facet and moderate to severe spinal stenosis at the L4-5 plan for left L4-5 transfacet ROB:  FINDINGS:  Segmentation: Normal.      Conus: The lower thoracic cord appears unremarkable. The conus  terminates at the level of the superior endplate of L2. Cauda equina  are unremarkable.      Epidural fluid: None.      Alignment: Equivocal/trace retrolisthesis of L3 on L4. Similar grade  1 anterolisthesis of L4 on L5 estimated at 2-3 mm.      Vertebral bodies: Vertebral body heights are maintained.      Marrow signal: Mild, diffuse nonspecific marrow heterogeneity. No  suspicious STIR hyperintensity or marrow edema.      Intervertebral discs: Mild degenerative disc height loss at L3-L4 and  L4-L5 with disc desiccation.          Degenerative change:      T12-L1: Unremarkable.      L1-2: Unremarkable.      L2-3: Minimal facet arthropathy and disc bulge. No spinal canal  stenosis or neural foraminal narrowing.      L3-4: Mild facet arthropathy. Mild disc bulge with superimposed left  foraminal disc protrusion component. Narrowing of the  left-greater-than-right lateral recesses with no additional spinal  canal stenosis. There is mild-to-moderate left and mild right neural  foraminal narrowing.      L4-5: Moderate to severe bilateral facet arthropathy with facet joint  effusions and ligamentum flavum hypertrophy. There is a 7 mm diameter  left facet synovial cyst projecting inferiorly and medially from the  left facet joint and indenting the thecal sac (series 7, image 8).  There is disc uncovering/bulge with minimal endplate spurring. Mildly  prominent dorsal epidural fat as well. Combination of factors  contribute to moderate to severe spinal canal stenosis. Moderate  right and severe left neural foraminal  narrowing.      L5-S1: Mild facet arthropathy. No spinal canal stenosis or  substantial neural foraminal narrowing.      Soft tissues: The prevertebral and posterior paraspinal soft tissues  are unremarkable.      IMPRESSION:  Degenerative grade 1 anterolisthesis at L4-L5 with moderate to severe  facet arthropathy including bilateral facet joint effusions and a  left facet synovial cyst projecting into the spinal canal that in  conjunction with disc uncovering/bulge and prominent epidural fat  contribute to moderate to severe spinal canal stenosis. There is also  severe left/moderate right neural foraminal narrowing at this level.      Additional degenerative change at L3-L4 as above.      MACRO:  None      Signed by: Ming Johnson 4/22/2024 10:55 AM      SUBJECTIVE:  This is 48 y.o.  female with PMH of morbid obesity BMI 43.4, KEVIN, hypertension, and fibromyalgia with multiple musculoskeletal complaints failed IV infusion therapy.  The patient is on Savella 50 mg twice daily, topiramate 50 mg twice daily, diclofenac 50 mg with acetaminophen 1000 mg 3 times daily as needed in addition to a neuro supplement.  The patient had x-rays of her left knee x-ray showed moderate to severe medial compartment arthritis and lumbar x-ray showed degenerative changes at L4-5 and L5-S1 with spondylolisthesis L4/L5 stable on flexion extension received bilateral L4-5 interlaminar ROB who is here for follow-up stating that her primary care has left her and her new primary care has ordered an EMG on her which is I agree with him since that she has radicular symptoms correlate with the L5 radiculopathy.  The patient stated that the epidural steroid injection has helped tremendously in her lower back pain but the shooting pain in her leg now it is really bothering her.  She has her x-ray showed L4-5 spondylolisthesis which very possibly affecting the L5 nerve root.  The patient will benefit greatly from MRI to see the extent of the  stenosis at that level may be possibility of surgical intervention.  She is having MRI on her knee that ordered by orthopedics and hopefully we can have the MRI on her back at the same time.      Prior office visit:  12/20/2023: This is 48 y.o.  female with PMH of morbid obesity BMI 43.4, KEVIN, hypertension, and fibromyalgia with multiple musculoskeletal complaints failed IV infusion therapy.  The patient is on Savella 50 mg twice daily, topiramate 50 mg twice daily, diclofenac 50 mg with acetaminophen 1000 mg 3 times daily as needed in addition to a neuro supplement.  The patient had x-rays of her left knee x-ray showed moderate to severe medial compartment arthritis and lumbar x-ray showed degenerative changes at L4-5 and L5-S1 with spondylolisthesis L4/L5 stable on flexion extension may be she is candidate for L5-S1 shallow angle epidural steroid injection and steroid versus Synvisc injection of the knee  who is here for follow-up complaining mostly of her left knee pain in addition to her usual chronic lower back pain.  The patient had x-ray done on her knee showed significant decrease in the height of her medial compartment in addition to mild tricompartment arthritis.  We proceeded with injection in office today and refilled her medications           Last procedure:   1/15/2024 bilateral L5-S1 interlaminar ROB patient has had **% improvement in pain and function  IV infusion therapy the patient has had a 0% improvement in pain 0% functional improvement.     Portions of record reviewed for pertinent issues: active problem list, medication list, allergies, family history, social history, notes from last encounter, encounters, lab results, imaging and other system records.     I have personally reviewed the [facility] report for this patient. This report is scanned into the electronic medical record. I have considered the risks of abuse, dependence, addiction and diversion. It showed: No controlled substance   showed  no controlled substance  OPIOID RISK ASSESSMENT SCORE 3/26  Aberrant behavior: None        Employment/Diasbility: Full-time with a [employer]   Social Hx and education:  no children denies smoking drinking or use of illicit drugs, her  is musical , 2 years of college        Diagnostic studies:  12/14/2023 lumbar x-ray showed facet joint arthropathy L4-5 and L5-S1, Grade 1 L4/L5 anterolisthesis stable on flexion extension  12/14/2023 left knee x-ray showed moderate medial compartment compression and mild tricompartment arthritis  7/28/2020 C-spine x-ray showed degenerative changes at C5-6         Review of Systems   HENT: Negative.     Eyes: Negative.    Respiratory: Negative.     Cardiovascular: Negative.    Gastrointestinal: Negative.    Endocrine: Negative.    Genitourinary: Negative.    Musculoskeletal:  Positive for arthralgias and myalgias.   Skin: Negative.    Neurological: Negative.    Hematological: Negative.    Psychiatric/Behavioral: Negative.          Physical Exam  Vitals and nursing note reviewed.   Constitutional:       Appearance: Normal appearance.       HENT:      Head: Normocephalic and atraumatic.      Nose: Nose normal.   Eyes:      Extraocular Movements: Extraocular movements intact.      Conjunctiva/sclera: Conjunctivae normal.      Pupils: Pupils are equal, round, and reactive to light.   Cardiovascular:      Rate and Rhythm: Normal rate and regular rhythm.      Pulses: Normal pulses.      Heart sounds: Normal heart sounds.   Pulmonary:      Effort: Pulmonary effort is normal.      Breath sounds: Normal breath sounds.   Abdominal:      General: Abdomen is flat. Bowel sounds are normal.      Palpations: Abdomen is soft.   Musculoskeletal:         General: Tenderness present.   Skin:     General: Skin is warm.   Neurological:      General: No focal deficit present.      Mental Status: She is alert and oriented to person, place, and time.    Psychiatric:         Mood and Affect: Mood normal.         Behavior: Behavior normal.                      Plan  At least 50% of the visit was involved in the discussion of the options for treatment. We discussed exercises, medication, interventional therapies and surgery. Healthy life style is essential with patient hard work to achieve the wellness. In addition; discussion with the patient and/or family about any of the diagnostic results, impressions and/or recommended diagnostic studies, prognosis, risks and benefits of treatment options, instructions for treatment and/or follow-up, importance of compliance with chosen treatment options, risk-factor reduction, and patient/family education.         Pool therapy, walking in the pool, at least 3x per week for 30 minutes  Continue self-directed physical therapy  MRI of the lumbar spine showed significant stenosis at the L4-5 with left-sided facet synovial cyst because of the patient's symptoms I recommend left transfacet epidural steroid injection after lorazepam 2 mg  Healthy lifestyle and anti-inflammatory diet in addition to weight control discussed with the patient  Alternative chronic pain therapies was discussed, encouraged and information was handed  Return to Clinic after MRI is     *Please note this report has been produced using speech recognition software and may contain errors related to that system including grammar, punctuation and spelling as well as words and phrases that may be inappropriate. If there are questions or concerns, please feel free to contact me to clarify.    Jose Lyons MD

## 2024-02-29 NOTE — PROGRESS NOTES
This is 48 y.o.  female with who has been treated for  L leg pain  . Pain is worse, The pain is described as achiness, intermittent, sharp, and stabbing and is relieved by nothing with who is here for follow-up   Chief Complaint   Patient presents with    Pain     6/10 L groin pain to L shin       Pain Therapies:  Infusions  IV therapy the patient has had a 0% improvement in pain 0% functional improvement.

## 2024-03-01 ASSESSMENT — ENCOUNTER SYMPTOMS
ENDOCRINE NEGATIVE: 1
HEMATOLOGIC/LYMPHATIC NEGATIVE: 1
CARDIOVASCULAR NEGATIVE: 1
ARTHRALGIAS: 1
EYES NEGATIVE: 1
PSYCHIATRIC NEGATIVE: 1
RESPIRATORY NEGATIVE: 1
MYALGIAS: 1
NEUROLOGICAL NEGATIVE: 1
GASTROINTESTINAL NEGATIVE: 1

## 2024-03-07 DIAGNOSIS — N64.89 BREAST ASYMMETRY: Primary | ICD-10-CM

## 2024-03-19 ENCOUNTER — HOSPITAL ENCOUNTER (OUTPATIENT)
Dept: RADIOLOGY | Facility: CLINIC | Age: 49
Discharge: HOME | End: 2024-03-19
Payer: COMMERCIAL

## 2024-03-19 DIAGNOSIS — S83.242A TEAR OF MEDIAL MENISCUS OF LEFT KNEE, CURRENT, UNSPECIFIED TEAR TYPE, INITIAL ENCOUNTER: ICD-10-CM

## 2024-03-19 PROCEDURE — 73721 MRI JNT OF LWR EXTRE W/O DYE: CPT | Mod: LEFT SIDE | Performed by: RADIOLOGY

## 2024-03-19 PROCEDURE — 73721 MRI JNT OF LWR EXTRE W/O DYE: CPT | Mod: LT

## 2024-03-29 ENCOUNTER — OFFICE VISIT (OUTPATIENT)
Dept: ORTHOPEDIC SURGERY | Facility: CLINIC | Age: 49
End: 2024-03-29
Payer: COMMERCIAL

## 2024-03-29 VITALS — HEIGHT: 64 IN | WEIGHT: 238 LBS | BODY MASS INDEX: 40.63 KG/M2

## 2024-03-29 DIAGNOSIS — S83.242S OTHER TEAR OF MEDIAL MENISCUS OF LEFT KNEE AS CURRENT INJURY, SEQUELA: Primary | ICD-10-CM

## 2024-03-29 PROCEDURE — 99213 OFFICE O/P EST LOW 20 MIN: CPT | Performed by: ORTHOPAEDIC SURGERY

## 2024-03-29 PROCEDURE — 1036F TOBACCO NON-USER: CPT | Performed by: ORTHOPAEDIC SURGERY

## 2024-03-29 NOTE — PROGRESS NOTES
Here discussed her MRI which shows a significant medial arthrosis as well as a meniscal tear  She is currently having only intermittent locking catching symptoms and overall her symptoms are better she notes a cortisone injection was not that helpful  We discussed the complexity of treating a torn cartilage with existing arthritis of this severity and then really the only indications for mechanical type symptoms she has arthritis and will ultimately require knee replacement surgery we discussed conservative measures including weight loss exercise activity modification  At this point she does not elect to proceed with arthroscopic intervention and follow-up as needed  All questions were answered

## 2024-04-19 PROBLEM — R20.2 PARESTHESIA: Status: RESOLVED | Noted: 2024-04-19 | Resolved: 2024-04-19

## 2024-04-19 PROBLEM — S83.249A TEAR OF MEDIAL MENISCUS OF KNEE: Status: RESOLVED | Noted: 2024-04-19 | Resolved: 2024-04-19

## 2024-04-19 PROBLEM — M25.569 KNEE PAIN: Status: RESOLVED | Noted: 2024-04-19 | Resolved: 2024-04-19

## 2024-04-19 PROBLEM — E66.01 OBESITY, MORBID, BMI 40.0-49.9 (MULTI): Status: RESOLVED | Noted: 2024-02-19 | Resolved: 2024-04-19

## 2024-04-19 PROBLEM — M43.16 SPONDYLOLISTHESIS OF LUMBAR REGION: Status: ACTIVE | Noted: 2024-02-19

## 2024-04-19 RX ORDER — TOPIRAMATE 50 MG/1
TABLET, FILM COATED ORAL
COMMUNITY
Start: 2024-03-20

## 2024-04-22 ENCOUNTER — LAB (OUTPATIENT)
Dept: LAB | Facility: LAB | Age: 49
End: 2024-04-22
Payer: COMMERCIAL

## 2024-04-22 ENCOUNTER — HOSPITAL ENCOUNTER (OUTPATIENT)
Dept: RADIOLOGY | Facility: CLINIC | Age: 49
Discharge: HOME | End: 2024-04-22
Payer: COMMERCIAL

## 2024-04-22 ENCOUNTER — OFFICE VISIT (OUTPATIENT)
Dept: PRIMARY CARE | Facility: CLINIC | Age: 49
End: 2024-04-22
Payer: COMMERCIAL

## 2024-04-22 VITALS
BODY MASS INDEX: 40.63 KG/M2 | SYSTOLIC BLOOD PRESSURE: 126 MMHG | HEIGHT: 64 IN | WEIGHT: 238 LBS | DIASTOLIC BLOOD PRESSURE: 82 MMHG | OXYGEN SATURATION: 99 % | HEART RATE: 96 BPM

## 2024-04-22 DIAGNOSIS — Z00.00 HEALTHCARE MAINTENANCE: ICD-10-CM

## 2024-04-22 DIAGNOSIS — M43.16 SPONDYLOLISTHESIS AT L4-L5 LEVEL: ICD-10-CM

## 2024-04-22 DIAGNOSIS — E66.9 OBESITY, UNSPECIFIED CLASSIFICATION, UNSPECIFIED OBESITY TYPE, UNSPECIFIED WHETHER SERIOUS COMORBIDITY PRESENT: ICD-10-CM

## 2024-04-22 DIAGNOSIS — M48.061 SPINAL STENOSIS OF LUMBAR REGION, UNSPECIFIED WHETHER NEUROGENIC CLAUDICATION PRESENT: Primary | ICD-10-CM

## 2024-04-22 DIAGNOSIS — M54.16 SPINAL STENOSIS OF LUMBAR REGION WITH RADICULOPATHY: ICD-10-CM

## 2024-04-22 DIAGNOSIS — M48.061 SPINAL STENOSIS OF LUMBAR REGION WITH RADICULOPATHY: ICD-10-CM

## 2024-04-22 LAB
EST. AVERAGE GLUCOSE BLD GHB EST-MCNC: 82 MG/DL
HBA1C MFR BLD: 4.5 %

## 2024-04-22 PROCEDURE — 36415 COLL VENOUS BLD VENIPUNCTURE: CPT

## 2024-04-22 PROCEDURE — 3079F DIAST BP 80-89 MM HG: CPT | Performed by: INTERNAL MEDICINE

## 2024-04-22 PROCEDURE — 83036 HEMOGLOBIN GLYCOSYLATED A1C: CPT

## 2024-04-22 PROCEDURE — 1036F TOBACCO NON-USER: CPT | Performed by: INTERNAL MEDICINE

## 2024-04-22 PROCEDURE — 3074F SYST BP LT 130 MM HG: CPT | Performed by: INTERNAL MEDICINE

## 2024-04-22 PROCEDURE — 72148 MRI LUMBAR SPINE W/O DYE: CPT

## 2024-04-22 PROCEDURE — 99214 OFFICE O/P EST MOD 30 MIN: CPT | Performed by: INTERNAL MEDICINE

## 2024-04-22 PROCEDURE — 72148 MRI LUMBAR SPINE W/O DYE: CPT | Performed by: RADIOLOGY

## 2024-04-22 RX ORDER — LORAZEPAM 2 MG/1
TABLET ORAL
Qty: 1 TABLET | Refills: 0 | Status: SHIPPED | OUTPATIENT
Start: 2024-04-22 | End: 2024-06-07 | Stop reason: ALTCHOICE

## 2024-04-22 ASSESSMENT — PATIENT HEALTH QUESTIONNAIRE - PHQ9
1. LITTLE INTEREST OR PLEASURE IN DOING THINGS: NOT AT ALL
2. FEELING DOWN, DEPRESSED OR HOPELESS: NOT AT ALL
SUM OF ALL RESPONSES TO PHQ9 QUESTIONS 1 AND 2: 0
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
## 2024-05-14 ENCOUNTER — OFFICE VISIT (OUTPATIENT)
Dept: NEUROSURGERY | Facility: CLINIC | Age: 49
End: 2024-05-14
Payer: COMMERCIAL

## 2024-05-14 VITALS
DIASTOLIC BLOOD PRESSURE: 72 MMHG | TEMPERATURE: 97.1 F | BODY MASS INDEX: 39.27 KG/M2 | HEART RATE: 101 BPM | SYSTOLIC BLOOD PRESSURE: 122 MMHG | HEIGHT: 64 IN | WEIGHT: 230 LBS

## 2024-05-14 DIAGNOSIS — M54.12 CERVICAL RADICULOPATHY: ICD-10-CM

## 2024-05-14 DIAGNOSIS — M48.061 SPINAL STENOSIS OF LUMBAR REGION, UNSPECIFIED WHETHER NEUROGENIC CLAUDICATION PRESENT: ICD-10-CM

## 2024-05-14 DIAGNOSIS — M54.16 LUMBAR RADICULOPATHY: Primary | ICD-10-CM

## 2024-05-14 PROCEDURE — 3074F SYST BP LT 130 MM HG: CPT | Performed by: PHYSICIAN ASSISTANT

## 2024-05-14 PROCEDURE — 99204 OFFICE O/P NEW MOD 45 MIN: CPT | Performed by: PHYSICIAN ASSISTANT

## 2024-05-14 PROCEDURE — 3078F DIAST BP <80 MM HG: CPT | Performed by: PHYSICIAN ASSISTANT

## 2024-05-14 RX ORDER — GABAPENTIN 300 MG/1
300 CAPSULE ORAL 2 TIMES DAILY
Qty: 60 CAPSULE | Refills: 0 | Status: SHIPPED | OUTPATIENT
Start: 2024-05-14 | End: 2024-06-07 | Stop reason: ALTCHOICE

## 2024-05-14 ASSESSMENT — PATIENT HEALTH QUESTIONNAIRE - PHQ9
2. FEELING DOWN, DEPRESSED OR HOPELESS: SEVERAL DAYS
SUM OF ALL RESPONSES TO PHQ9 QUESTIONS 1 & 2: 1
10. IF YOU CHECKED OFF ANY PROBLEMS, HOW DIFFICULT HAVE THESE PROBLEMS MADE IT FOR YOU TO DO YOUR WORK, TAKE CARE OF THINGS AT HOME, OR GET ALONG WITH OTHER PEOPLE: SOMEWHAT DIFFICULT
1. LITTLE INTEREST OR PLEASURE IN DOING THINGS: NOT AT ALL

## 2024-05-14 ASSESSMENT — LIFESTYLE VARIABLES
SKIP TO QUESTIONS 9-10: 1
AUDIT-C TOTAL SCORE: 1
HOW MANY STANDARD DRINKS CONTAINING ALCOHOL DO YOU HAVE ON A TYPICAL DAY: 1 OR 2
HOW OFTEN DO YOU HAVE A DRINK CONTAINING ALCOHOL: MONTHLY OR LESS
HOW OFTEN DO YOU HAVE SIX OR MORE DRINKS ON ONE OCCASION: NEVER

## 2024-05-14 ASSESSMENT — PAIN SCALES - GENERAL: PAINLEVEL: 7
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
## 2024-05-24 ENCOUNTER — HOSPITAL ENCOUNTER (OUTPATIENT)
Dept: NEUROLOGY | Facility: HOSPITAL | Age: 49
Discharge: HOME | End: 2024-05-24
Payer: COMMERCIAL

## 2024-05-24 DIAGNOSIS — R20.2 PARESTHESIAS: ICD-10-CM

## 2024-05-24 PROCEDURE — 95886 MUSC TEST DONE W/N TEST COMP: CPT | Performed by: PSYCHIATRY & NEUROLOGY

## 2024-05-24 PROCEDURE — 95911 NRV CNDJ TEST 9-10 STUDIES: CPT | Performed by: PSYCHIATRY & NEUROLOGY

## 2024-06-07 ENCOUNTER — OFFICE VISIT (OUTPATIENT)
Dept: PRIMARY CARE | Facility: CLINIC | Age: 49
End: 2024-06-07
Payer: COMMERCIAL

## 2024-06-07 VITALS
BODY MASS INDEX: 41.48 KG/M2 | HEIGHT: 64 IN | WEIGHT: 243 LBS | DIASTOLIC BLOOD PRESSURE: 84 MMHG | SYSTOLIC BLOOD PRESSURE: 132 MMHG

## 2024-06-07 DIAGNOSIS — R20.2 PARESTHESIAS: ICD-10-CM

## 2024-06-07 DIAGNOSIS — I10 PRIMARY HYPERTENSION: Primary | ICD-10-CM

## 2024-06-07 DIAGNOSIS — M54.12 CERVICAL RADICULOPATHY: ICD-10-CM

## 2024-06-07 DIAGNOSIS — M54.16 LUMBAR RADICULOPATHY: ICD-10-CM

## 2024-06-07 DIAGNOSIS — E66.9 OBESITY, UNSPECIFIED CLASSIFICATION, UNSPECIFIED OBESITY TYPE, UNSPECIFIED WHETHER SERIOUS COMORBIDITY PRESENT: ICD-10-CM

## 2024-06-07 DIAGNOSIS — M25.512 LEFT SHOULDER PAIN, UNSPECIFIED CHRONICITY: ICD-10-CM

## 2024-06-07 PROCEDURE — 3075F SYST BP GE 130 - 139MM HG: CPT | Performed by: INTERNAL MEDICINE

## 2024-06-07 PROCEDURE — 3079F DIAST BP 80-89 MM HG: CPT | Performed by: INTERNAL MEDICINE

## 2024-06-07 PROCEDURE — 99214 OFFICE O/P EST MOD 30 MIN: CPT | Performed by: INTERNAL MEDICINE

## 2024-06-07 RX ORDER — LISINOPRIL AND HYDROCHLOROTHIAZIDE 20; 25 MG/1; MG/1
TABLET ORAL
Qty: 90 TABLET | Refills: 1 | Status: SHIPPED | OUTPATIENT
Start: 2024-06-07

## 2024-06-07 RX ORDER — GABAPENTIN 100 MG/1
100 CAPSULE ORAL 2 TIMES DAILY
Qty: 180 CAPSULE | Refills: 1 | Status: SHIPPED | OUTPATIENT
Start: 2024-06-07 | End: 2024-09-05
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
## 2024-06-11 ENCOUNTER — HOSPITAL ENCOUNTER (OUTPATIENT)
Dept: RADIOLOGY | Facility: CLINIC | Age: 49
Discharge: HOME | End: 2024-06-11
Payer: COMMERCIAL

## 2024-06-11 ENCOUNTER — APPOINTMENT (OUTPATIENT)
Dept: PRIMARY CARE | Facility: CLINIC | Age: 49
End: 2024-06-11
Payer: COMMERCIAL

## 2024-06-11 VITALS — HEIGHT: 62 IN | BODY MASS INDEX: 43.24 KG/M2 | WEIGHT: 235 LBS

## 2024-06-11 DIAGNOSIS — N64.89 BREAST ASYMMETRY: ICD-10-CM

## 2024-06-11 PROCEDURE — 76642 ULTRASOUND BREAST LIMITED: CPT | Mod: RT

## 2024-06-11 PROCEDURE — 77065 DX MAMMO INCL CAD UNI: CPT | Mod: RT

## 2024-06-11 PROCEDURE — 76983 USE EA ADDL TARGET LESION: CPT | Mod: RT

## 2024-06-17 DIAGNOSIS — M54.9 CHRONIC BACK PAIN, UNSPECIFIED BACK LOCATION, UNSPECIFIED BACK PAIN LATERALITY: Primary | ICD-10-CM

## 2024-06-17 DIAGNOSIS — G89.29 CHRONIC BACK PAIN, UNSPECIFIED BACK LOCATION, UNSPECIFIED BACK PAIN LATERALITY: Primary | ICD-10-CM

## 2024-06-24 ENCOUNTER — APPOINTMENT (OUTPATIENT)
Dept: PRIMARY CARE | Facility: CLINIC | Age: 49
End: 2024-06-24
Payer: COMMERCIAL

## 2024-06-26 DIAGNOSIS — Z12.31 VISIT FOR SCREENING MAMMOGRAM: Primary | ICD-10-CM

## 2024-07-17 ENCOUNTER — HOSPITAL ENCOUNTER (OUTPATIENT)
Dept: RADIOLOGY | Facility: CLINIC | Age: 49
Discharge: HOME | End: 2024-07-17
Payer: COMMERCIAL

## 2024-07-17 DIAGNOSIS — M25.512 LEFT SHOULDER PAIN, UNSPECIFIED CHRONICITY: ICD-10-CM

## 2024-07-17 DIAGNOSIS — M54.12 CERVICAL RADICULOPATHY: ICD-10-CM

## 2024-07-17 PROCEDURE — 73030 X-RAY EXAM OF SHOULDER: CPT | Mod: LT

## 2024-07-17 PROCEDURE — 73030 X-RAY EXAM OF SHOULDER: CPT | Mod: LEFT SIDE | Performed by: RADIOLOGY

## 2024-07-17 PROCEDURE — 72050 X-RAY EXAM NECK SPINE 4/5VWS: CPT | Performed by: RADIOLOGY

## 2024-07-17 PROCEDURE — 72050 X-RAY EXAM NECK SPINE 4/5VWS: CPT

## 2024-07-18 RX ORDER — GABAPENTIN 300 MG/1
CAPSULE ORAL
Refills: 0 | OUTPATIENT
Start: 2024-07-18

## 2024-07-19 ENCOUNTER — APPOINTMENT (OUTPATIENT)
Dept: PRIMARY CARE | Facility: CLINIC | Age: 49
End: 2024-07-19
Payer: COMMERCIAL

## 2024-07-19 VITALS — SYSTOLIC BLOOD PRESSURE: 93 MMHG | BODY MASS INDEX: 42.43 KG/M2 | WEIGHT: 232 LBS | DIASTOLIC BLOOD PRESSURE: 66 MMHG

## 2024-07-19 DIAGNOSIS — I10 PRIMARY HYPERTENSION: ICD-10-CM

## 2024-07-19 DIAGNOSIS — E66.9 OBESITY, UNSPECIFIED CLASSIFICATION, UNSPECIFIED OBESITY TYPE, UNSPECIFIED WHETHER SERIOUS COMORBIDITY PRESENT: ICD-10-CM

## 2024-07-19 DIAGNOSIS — F32.A DEPRESSION, UNSPECIFIED DEPRESSION TYPE: Primary | ICD-10-CM

## 2024-07-19 DIAGNOSIS — G89.29 CHRONIC LEFT SHOULDER PAIN: ICD-10-CM

## 2024-07-19 DIAGNOSIS — M25.512 CHRONIC LEFT SHOULDER PAIN: ICD-10-CM

## 2024-07-19 DIAGNOSIS — R20.2 PARESTHESIAS: ICD-10-CM

## 2024-07-19 PROCEDURE — 99215 OFFICE O/P EST HI 40 MIN: CPT | Performed by: INTERNAL MEDICINE

## 2024-07-19 PROCEDURE — 1036F TOBACCO NON-USER: CPT | Performed by: INTERNAL MEDICINE

## 2024-07-19 PROCEDURE — 3074F SYST BP LT 130 MM HG: CPT | Performed by: INTERNAL MEDICINE

## 2024-07-19 PROCEDURE — 3078F DIAST BP <80 MM HG: CPT | Performed by: INTERNAL MEDICINE

## 2024-07-19 RX ORDER — BUPROPION HYDROCHLORIDE 150 MG/1
150 TABLET ORAL EVERY MORNING
Qty: 90 TABLET | Refills: 1 | Status: SHIPPED | OUTPATIENT
Start: 2024-07-19

## 2024-07-19 RX ORDER — GABAPENTIN 100 MG/1
200 CAPSULE ORAL 3 TIMES DAILY
Qty: 270 CAPSULE | Refills: 1 | Status: SHIPPED | OUTPATIENT
Start: 2024-07-19 | End: 2024-10-17

## 2024-07-19 RX ORDER — LISINOPRIL 20 MG/1
20 TABLET ORAL DAILY
Qty: 90 TABLET | Refills: 1 | Status: SHIPPED | OUTPATIENT
Start: 2024-07-26 | End: 2024-10-24

## 2024-07-19 RX ORDER — LISINOPRIL 20 MG/1
20 TABLET ORAL DAILY
Qty: 90 TABLET | Refills: 1 | Status: SHIPPED | OUTPATIENT
Start: 2024-07-19 | End: 2024-07-19 | Stop reason: ALTCHOICE

## 2024-07-19 RX ORDER — BUPROPION HYDROCHLORIDE 300 MG/1
300 TABLET ORAL DAILY
Qty: 90 TABLET | Refills: 1 | Status: SHIPPED | OUTPATIENT
Start: 2024-07-19

## 2024-07-19 RX ORDER — LISINOPRIL 20 MG/1
20 TABLET ORAL DAILY
Qty: 30 TABLET | Refills: 0 | Status: SHIPPED | OUTPATIENT
Start: 2024-07-19 | End: 2024-08-18

## 2024-07-19 RX ORDER — TOPIRAMATE 50 MG/1
100 TABLET, FILM COATED ORAL DAILY
Start: 2024-07-19
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
## 2024-07-22 DIAGNOSIS — E66.9 OBESITY, UNSPECIFIED CLASSIFICATION, UNSPECIFIED OBESITY TYPE, UNSPECIFIED WHETHER SERIOUS COMORBIDITY PRESENT: ICD-10-CM

## 2024-07-22 DIAGNOSIS — M79.7 FIBROMYALGIA: Primary | ICD-10-CM

## 2024-07-22 RX ORDER — DICLOFENAC POTASSIUM 50 MG/1
TABLET, FILM COATED ORAL
Qty: 270 TABLET | Refills: 3 | Status: SHIPPED | OUTPATIENT
Start: 2024-07-22

## 2024-07-24 ENCOUNTER — APPOINTMENT (OUTPATIENT)
Dept: ORTHOPEDIC SURGERY | Facility: CLINIC | Age: 49
End: 2024-07-24
Payer: COMMERCIAL

## 2024-08-06 ENCOUNTER — APPOINTMENT (OUTPATIENT)
Dept: ORTHOPEDIC SURGERY | Facility: CLINIC | Age: 49
End: 2024-08-06
Payer: COMMERCIAL

## 2024-08-06 ENCOUNTER — LAB (OUTPATIENT)
Dept: LAB | Facility: LAB | Age: 49
End: 2024-08-06
Payer: COMMERCIAL

## 2024-08-06 ENCOUNTER — OFFICE VISIT (OUTPATIENT)
Dept: PRIMARY CARE | Facility: CLINIC | Age: 49
End: 2024-08-06
Payer: COMMERCIAL

## 2024-08-06 VITALS — DIASTOLIC BLOOD PRESSURE: 80 MMHG | SYSTOLIC BLOOD PRESSURE: 116 MMHG

## 2024-08-06 DIAGNOSIS — Z00.00 HEALTHCARE MAINTENANCE: ICD-10-CM

## 2024-08-06 DIAGNOSIS — G89.29 CHRONIC LEFT SHOULDER PAIN: ICD-10-CM

## 2024-08-06 DIAGNOSIS — L65.9 HAIR LOSS: ICD-10-CM

## 2024-08-06 DIAGNOSIS — I10 PRIMARY HYPERTENSION: ICD-10-CM

## 2024-08-06 DIAGNOSIS — L65.9 HAIR LOSS: Primary | ICD-10-CM

## 2024-08-06 DIAGNOSIS — M25.512 CHRONIC LEFT SHOULDER PAIN: ICD-10-CM

## 2024-08-06 DIAGNOSIS — F32.A DEPRESSION, UNSPECIFIED DEPRESSION TYPE: ICD-10-CM

## 2024-08-06 DIAGNOSIS — M75.02 ADHESIVE CAPSULITIS OF LEFT SHOULDER: ICD-10-CM

## 2024-08-06 LAB
25(OH)D3 SERPL-MCNC: 59 NG/ML (ref 30–100)
ALBUMIN SERPL BCP-MCNC: 4.4 G/DL (ref 3.4–5)
ALP SERPL-CCNC: 77 U/L (ref 33–110)
ALT SERPL W P-5'-P-CCNC: 25 U/L (ref 7–45)
ANION GAP SERPL CALC-SCNC: 15 MMOL/L (ref 10–20)
AST SERPL W P-5'-P-CCNC: 18 U/L (ref 9–39)
BASOPHILS # BLD AUTO: 0.05 X10*3/UL (ref 0–0.1)
BASOPHILS NFR BLD AUTO: 0.5 %
BILIRUB SERPL-MCNC: 0.4 MG/DL (ref 0–1.2)
BUN SERPL-MCNC: 17 MG/DL (ref 6–23)
CALCIUM SERPL-MCNC: 9.4 MG/DL (ref 8.6–10.6)
CCP IGG SERPL-ACNC: <1 U/ML
CHLORIDE SERPL-SCNC: 106 MMOL/L (ref 98–107)
CHOLEST SERPL-MCNC: 211 MG/DL (ref 0–199)
CHOLESTEROL/HDL RATIO: 3.8
CO2 SERPL-SCNC: 23 MMOL/L (ref 21–32)
CREAT SERPL-MCNC: 1.15 MG/DL (ref 0.5–1.05)
EGFRCR SERPLBLD CKD-EPI 2021: 59 ML/MIN/1.73M*2
EOSINOPHIL # BLD AUTO: 0.31 X10*3/UL (ref 0–0.7)
EOSINOPHIL NFR BLD AUTO: 3.1 %
ERYTHROCYTE [DISTWIDTH] IN BLOOD BY AUTOMATED COUNT: 14.3 % (ref 11.5–14.5)
EST. AVERAGE GLUCOSE BLD GHB EST-MCNC: 100 MG/DL
FSH SERPL-ACNC: 7.8 IU/L
GLUCOSE SERPL-MCNC: 82 MG/DL (ref 74–99)
HBA1C MFR BLD: 5.1 %
HCT VFR BLD AUTO: 43.3 % (ref 36–46)
HDLC SERPL-MCNC: 55.3 MG/DL
HGB BLD-MCNC: 13.6 G/DL (ref 12–16)
IMM GRANULOCYTES # BLD AUTO: 0.02 X10*3/UL (ref 0–0.7)
IMM GRANULOCYTES NFR BLD AUTO: 0.2 % (ref 0–0.9)
LDLC SERPL CALC-MCNC: 126 MG/DL
LH SERPL-ACNC: 10.1 IU/L
LYMPHOCYTES # BLD AUTO: 3.03 X10*3/UL (ref 1.2–4.8)
LYMPHOCYTES NFR BLD AUTO: 30.6 %
MCH RBC QN AUTO: 29.5 PG (ref 26–34)
MCHC RBC AUTO-ENTMCNC: 31.4 G/DL (ref 32–36)
MCV RBC AUTO: 94 FL (ref 80–100)
MONOCYTES # BLD AUTO: 0.55 X10*3/UL (ref 0.1–1)
MONOCYTES NFR BLD AUTO: 5.6 %
NEUTROPHILS # BLD AUTO: 5.94 X10*3/UL (ref 1.2–7.7)
NEUTROPHILS NFR BLD AUTO: 60 %
NON HDL CHOLESTEROL: 156 MG/DL (ref 0–149)
NRBC BLD-RTO: 0 /100 WBCS (ref 0–0)
PLATELET # BLD AUTO: 376 X10*3/UL (ref 150–450)
POTASSIUM SERPL-SCNC: 4.7 MMOL/L (ref 3.5–5.3)
PROT SERPL-MCNC: 7.1 G/DL (ref 6.4–8.2)
RBC # BLD AUTO: 4.61 X10*6/UL (ref 4–5.2)
RHEUMATOID FACT SER NEPH-ACNC: <10 IU/ML (ref 0–15)
SODIUM SERPL-SCNC: 139 MMOL/L (ref 136–145)
T4 FREE SERPL-MCNC: 1.21 NG/DL (ref 0.78–1.48)
TRIGL SERPL-MCNC: 149 MG/DL (ref 0–149)
TSH SERPL-ACNC: 2.85 MIU/L (ref 0.44–3.98)
VLDL: 30 MG/DL (ref 0–40)
WBC # BLD AUTO: 9.9 X10*3/UL (ref 4.4–11.3)

## 2024-08-06 PROCEDURE — 85025 COMPLETE CBC W/AUTO DIFF WBC: CPT

## 2024-08-06 PROCEDURE — 36415 COLL VENOUS BLD VENIPUNCTURE: CPT

## 2024-08-06 PROCEDURE — 84439 ASSAY OF FREE THYROXINE: CPT

## 2024-08-06 PROCEDURE — 80053 COMPREHEN METABOLIC PANEL: CPT

## 2024-08-06 PROCEDURE — 83001 ASSAY OF GONADOTROPIN (FSH): CPT

## 2024-08-06 PROCEDURE — 86200 CCP ANTIBODY: CPT

## 2024-08-06 PROCEDURE — 3074F SYST BP LT 130 MM HG: CPT | Performed by: INTERNAL MEDICINE

## 2024-08-06 PROCEDURE — 82672 ASSAY OF ESTROGEN: CPT

## 2024-08-06 PROCEDURE — 86431 RHEUMATOID FACTOR QUANT: CPT

## 2024-08-06 PROCEDURE — 99215 OFFICE O/P EST HI 40 MIN: CPT | Performed by: INTERNAL MEDICINE

## 2024-08-06 PROCEDURE — 1036F TOBACCO NON-USER: CPT | Performed by: INTERNAL MEDICINE

## 2024-08-06 PROCEDURE — 83002 ASSAY OF GONADOTROPIN (LH): CPT

## 2024-08-06 PROCEDURE — 80061 LIPID PANEL: CPT

## 2024-08-06 PROCEDURE — 84443 ASSAY THYROID STIM HORMONE: CPT

## 2024-08-06 PROCEDURE — 1036F TOBACCO NON-USER: CPT | Performed by: ORTHOPAEDIC SURGERY

## 2024-08-06 PROCEDURE — 99213 OFFICE O/P EST LOW 20 MIN: CPT | Performed by: ORTHOPAEDIC SURGERY

## 2024-08-06 PROCEDURE — 82306 VITAMIN D 25 HYDROXY: CPT

## 2024-08-06 PROCEDURE — 3079F DIAST BP 80-89 MM HG: CPT | Performed by: INTERNAL MEDICINE

## 2024-08-06 PROCEDURE — 83036 HEMOGLOBIN GLYCOSYLATED A1C: CPT

## 2024-08-06 PROCEDURE — 86038 ANTINUCLEAR ANTIBODIES: CPT

## 2024-08-06 RX ORDER — LISINOPRIL 20 MG/1
20 TABLET ORAL DAILY
Qty: 90 TABLET | Refills: 1 | Status: SHIPPED | OUTPATIENT
Start: 2024-08-06 | End: 2025-02-02
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
## 2024-08-06 NOTE — LETTER
August 7, 2024     Patient: Theodora Montez   YOB: 1975   Date of Visit: 8/6/2024       To Whom It May Concern:    Theodora Montez was seen in my clinic on 8/6/2024 at 9:00 am. Please excuse Theodora for her absence from work on this day to make the appointment.    If you have any questions or concerns, please don't hesitate to call.         Sincerely,         Jose Roberto Saxena MD        CC: No Recipients

## 2024-08-07 NOTE — PROGRESS NOTES
48-year-old is seen with left shoulder pain that she has had for 2 months.  She has been having persistent moderate throbbing pain and loss of range of motion.  She is right-hand dominant.  She takes gabapentin primarily for her back.  She is also taken ibuprofen and used topical ointments and lidocaine patch.  She has had chiropractic treatments and massage therapy.  She has fibromyalgia.  She is a .    Pleasant and no acute distress.  Left shoulder forward flexion 140°, abduction 60 degrees, external rotation 5 degrees, internal rotation to her side. No effusion or instability. There is positive Neer and Diaz impingement. There is subacromial crepitus and tenderness around the subacromial space. No biceps tenderness. No acromioclavicular tenderness. Rotator cuff strength is intact but there is discomfort with strength testing. Right shoulder forward flexion 180°. No effusion or instability. No impingement or crepitus or tenderness involving the subacromial space. No biceps or acromioclavicular tenderness. There is intact rotator cuff strength. There is adequate range of motion of the cervical spine without pain. Both upper extremities are well perfused skin is intact and muscle tone is adequate. Elbow flexion and extension and wrist flexion and extension strength are intact.    Multiple x-ray views of the left shoulder are personally reviewed and there is no acute bony abnormality.    A discussion about adhesive capsulitis was done.  Treatment options were reviewed.  She will perform physical therapy.  She will continue with her medication regimen.  The fact that there is a role for gabapentin and adhesive capsulitis was discussed.  Cortisone injection could be considered.  She has an MRI scheduled next week.  If she does not improve with conservative treatment then manipulation under anesthesia and arthroscopy with capsular release could be a consideration.

## 2024-08-09 LAB — ANA SER QL HEP2 SUBST: NEGATIVE

## 2024-08-10 DIAGNOSIS — I10 PRIMARY HYPERTENSION: ICD-10-CM

## 2024-08-12 ENCOUNTER — HOSPITAL ENCOUNTER (OUTPATIENT)
Dept: RADIOLOGY | Facility: CLINIC | Age: 49
Discharge: HOME | End: 2024-08-12
Payer: COMMERCIAL

## 2024-08-12 DIAGNOSIS — G89.29 CHRONIC LEFT SHOULDER PAIN: ICD-10-CM

## 2024-08-12 DIAGNOSIS — M25.512 CHRONIC LEFT SHOULDER PAIN: ICD-10-CM

## 2024-08-12 PROCEDURE — 73221 MRI JOINT UPR EXTREM W/O DYE: CPT | Mod: LEFT SIDE | Performed by: RADIOLOGY

## 2024-08-12 PROCEDURE — 73221 MRI JOINT UPR EXTREM W/O DYE: CPT | Mod: LT

## 2024-08-13 ENCOUNTER — TELEPHONE (OUTPATIENT)
Dept: ORTHOPEDIC SURGERY | Facility: CLINIC | Age: 49
End: 2024-08-13

## 2024-08-13 ENCOUNTER — APPOINTMENT (OUTPATIENT)
Dept: PRIMARY CARE | Facility: CLINIC | Age: 49
End: 2024-08-13
Payer: COMMERCIAL

## 2024-08-13 LAB — ESTROGEN SERPL-MCNC: 802 PG/ML

## 2024-08-13 RX ORDER — LISINOPRIL 20 MG/1
20 TABLET ORAL DAILY
Qty: 90 TABLET | Refills: 1 | Status: SHIPPED | OUTPATIENT
Start: 2024-08-13

## 2024-08-13 NOTE — TELEPHONE ENCOUNTER
Patient states she was to let you know when her MRI results were in for her shoulder. MRI was ordered by her PCP. She was originally tod at her appointment with you to continue PT and follow up in a few months if not improving. Thanks Florinda

## 2024-08-14 DIAGNOSIS — N18.30 STAGE 3 CHRONIC KIDNEY DISEASE, UNSPECIFIED WHETHER STAGE 3A OR 3B CKD (MULTI): ICD-10-CM

## 2024-08-14 DIAGNOSIS — E78.5 HYPERLIPIDEMIA, UNSPECIFIED HYPERLIPIDEMIA TYPE: Primary | ICD-10-CM

## 2024-08-14 RX ORDER — ATORVASTATIN CALCIUM 10 MG/1
10 TABLET, FILM COATED ORAL DAILY
Qty: 90 TABLET | Refills: 1 | Status: SHIPPED | OUTPATIENT
Start: 2024-08-14 | End: 2024-11-12

## 2024-08-16 DIAGNOSIS — L65.9 HAIR LOSS: Primary | ICD-10-CM

## 2024-08-16 DIAGNOSIS — M25.50 ARTHRALGIA, UNSPECIFIED JOINT: ICD-10-CM

## 2024-08-19 ENCOUNTER — EVALUATION (OUTPATIENT)
Dept: PHYSICAL THERAPY | Facility: CLINIC | Age: 49
End: 2024-08-19
Payer: COMMERCIAL

## 2024-08-19 DIAGNOSIS — G89.29 CHRONIC LEFT SHOULDER PAIN: ICD-10-CM

## 2024-08-19 DIAGNOSIS — M25.512 CHRONIC LEFT SHOULDER PAIN: ICD-10-CM

## 2024-08-19 DIAGNOSIS — M25.612 DECREASED RANGE OF MOTION OF LEFT SHOULDER: ICD-10-CM

## 2024-08-19 DIAGNOSIS — M75.02 ADHESIVE CAPSULITIS OF LEFT SHOULDER: Primary | ICD-10-CM

## 2024-08-19 PROCEDURE — 97162 PT EVAL MOD COMPLEX 30 MIN: CPT | Mod: GP

## 2024-08-19 PROCEDURE — 97110 THERAPEUTIC EXERCISES: CPT | Mod: GP

## 2024-08-19 ASSESSMENT — PAIN SCALES - GENERAL: PAINLEVEL_OUTOF10: 2

## 2024-08-19 ASSESSMENT — ENCOUNTER SYMPTOMS
DEPRESSION: 0
OCCASIONAL FEELINGS OF UNSTEADINESS: 0
LOSS OF SENSATION IN FEET: 0

## 2024-08-19 ASSESSMENT — PAIN - FUNCTIONAL ASSESSMENT: PAIN_FUNCTIONAL_ASSESSMENT: 0-10

## 2024-08-19 NOTE — PROGRESS NOTES
Physical Therapy  Physical Therapy Orthopedic Evaluation    Patient Name: Theodora Montez  MRN: [id number]  Today's Date: 8/20/2024  Time Calculation  Start Time: 1800  Stop Time: 1845  Time Calculation (min): 45 min  PT Evaluation Time Entry  PT Evaluation (Moderate) Time Entry: 30   PT Therapeutic Procedures Time Entry  Therapeutic Exercise Time Entry: 15          Insurance:  Visit number: 1 of 60  Authorization info: no auth  Insurance Type: Payor: MEDICAL [facility] / Plan: [employer]ICAL [employer] / Product Type: *No Product type* /      Current Problem  1. Adhesive capsulitis of left shoulder  Referral to Physical Therapy    Follow Up In Physical Therapy      2. Decreased range of motion of left shoulder  Follow Up In Physical Therapy      3. Chronic left shoulder pain  Follow Up In Physical Therapy          Referred by: Dr. Cheng    General:  General  Reason for Referral: L adhesive capsulitis  Referred By: Dr. Saxena    Precautions:  Precautions  DBADI Fall Risk Score (The score of 4 or more indicates an increased risk of falling): 0  Precautions Comment: hypertension, possible DM, CKD stage 2, fibromyalgia  Medical History Form: Reviewed (scanned into chart)    Subjective:   Subjective   Chief Complaint: Patient presents w/ L shoulder pain and decreased ROM that started around 2 months ago. Since then she has tried chiropractic, massage therapy, Reiki. All have provided mild relief, still feels limited w/ her ROM and is experiencing a moderate amount of pain. Today is the best she has felt in some time after active release therapy session yesterday.  Onset: May 2024  DEIRDRE: insidious    Current Condition:   Better    Pain:  Pain Assessment: 0-10  0-10 (Numeric) Pain Score: 2 (2 at rest, 7+ w/ certain movements)  Location: L shoulder   Description: aching, sharp  Aggravating Factors:  Reaching Overhead and Reaching Behind Back  Relieving Factors:  Stretching, heat    Relevant Information (PMH & Previous  Tests/Imaging): Plain films, MRI, both reveal mild ACJ OA. Hypertension, possible DM, CKD stage 2, fibromyalgia.  Previous Interventions/Treatments: Massage and ChiropracticCem    Prior Level of Function (PLOF)  Patient previously independent with all ADLs  Exercise/Physical Activity: walking  Work/School: works full time as a     Patients Living Environment: Reviewed and no concern  Primary Language: English  Patient's Goal(s) for Therapy: improve ROM/flexibility and reduce pain    Red Flags: Do you have any of the following? Yes  Fever/chills, unexplained weight changes, dizziness/fainting, unexplained change in bowel or bladder functions, unexplained malaise or muscle weakness, night pain/sweats, numbness or tingling    Objective:   ROM    Cervical AROM (Degrees)    Flexion: 60  Extension: 25  (L) Side Bend: 35  (R) Side Bend: 45  (L) Rotation: 45  (R) Rotation: 55      Shoulder AROM (Degrees)      (R)  (L)  Flexion: 180  155*  Abduction: 180  85*     Functional ER: C6  C1     Functional IR: T7  L5     *end range pain    Shoulder PROM (Degrees)      (R)  (L)  Flexion: 180  150*      Abduction: 180  100*     ER at 0:  90  20*     IR at 0:  belly  belly         ER at 90: 90  10      IR at 90: NT  NT      *end range pain    Elbow AROM (Degrees)      (R)  (L)  Flexion: wfl  wfl      Extension: wfl  wfl            Strength Testing  Deferred  Shoulder    (R)  (L)  Flexion:         Abduction:        ER:         IR:             Elbow    (R)  (L)  Flexion:          Extension:            Periscapular Musculature    (R)  (L)  Upper Traps:        Middle Traps:        Lower Traps:        Rhomboids:              Posture: mild scapular winging      Palpation: TTP       Joint Mobility: hypomobile GHJ      Observation: mild ecchymosis present in multiple locations from active release therapy yesterday. Completes available ROM w/o shrugging.           Special Tests    Cervical    Cervical flexion  rotation test:  ULTT  Median:  Radial:  Ulnar:  DNF endurance:  Alar ligament:  Sharp Frederick:      RTC/Impingement   Neer Impingement:    Diaz Elkin:    Empty Can:    Drop Arm:    Painful Arc:    Lift Off Test:   AC Joint   Cross Arm Test:   Biceps   Speeds Test:    Yergason Test:   SLAP   O'Brians Test:    Bicep load II:   Instability   Anterior Apprehension:    Posterior Apprehension:    Sulcus Test:   Thoracic Outlet   Adson's Test:    Shamir Test:        Radicular Symptoms:         Outcome Measures:  Other Measures  Disability of Arm Shoulder Hand (DASH): 59.1% (quickdash)     Treatments:    There-ex: 15'  Supine wand ER 2x10*  Supine wand flexion AAROM 2x10, x10 w/ dowel*, x10 prayer   Modified posterior capsule stretch x30*  Circular pendulums x20*  Doorway ER stretch to tolerance x20*  Table slides abduction*    * = added to HEP.  Sheet with exercise descriptions and images issued.  Skilled intervention utilized in the appropriate selection & application of above exercises.  Verbal and tactile cues provided for proper form and technique.  Pt. demonstrated appropriate form & verbalized understanding of optimal technique for above exercises.      [facility].[employer]    Access Code: [email or web address]      EDUCATION: Home exercise program, plan of care, activity modifications, pain management, and injury pathology       Assessment: Patient presents with signs and symptoms consistent with L adhesive capsulitis, resulting in limited participation in pain-free ADLs and inability to perform at their prior level of function. Pt would benefit from physical therapy to address the impairments found & listed previously in the objective section in order to return to safe and pain-free ADLs and prior level of function.       Plan:  PT Plan: Skilled PT  PT Frequency: 1 time per week  Duration: 8 weeks  Onset Date: 08/19/24  Rehab Potential: Good (due to current clinical presentation and positive past response  to therapy)  Plan of Care Agreement: Patient  Planned Interventions include: therapeutic exercise, self-care home management, manual therapy, therapeutic activities, gait training, neuromuscular coordination, vasopneumatic, dry needling, aquatic therapy  Goals: Set and discussed today  Active       Decreased ROM L shoulder       STGs       Start:  08/20/24    Expected End:  09/17/24       1. Demonstrate independence with home exercise program  2. Tolerate increased exercise without adverse reaction  3. Demonstrate improved posture & proper body mechanics throughout session  4. Increase FF and abduction ROM to at least 165 and 120 respectively to progress towards LTGs  5. Increase gross shoulder strength to at least 4-/5 to progress towards LTGs.         LTGs       Start:  08/20/24    Expected End:  10/15/24       1. Increase flexion and abduction ROM to pain free + 180 each to improve ADLs  2. Increase gross shoulder strength to pain free + 5/5  3. Decrease score of Quick DASH by > 8 points to meet the MCID  4. Perform ADLs without an increase symptoms  5. Increase shoulder total arc of rotation to at least 160 degs to improve ADLs  6. Increase periscapular strength to at least 4-/5 to improve ADLs                Plan of care was developed with input and agreement by the patient      Rashi Mercado, JANUARY

## 2024-08-27 ENCOUNTER — TELEPHONE (OUTPATIENT)
Dept: ORTHOPEDIC SURGERY | Facility: CLINIC | Age: 49
End: 2024-08-27
Payer: COMMERCIAL

## 2024-08-27 NOTE — TELEPHONE ENCOUNTER
Have called patient multiple times at [phone], no answer, voicemails have been left.  Advised patient to please call office back at [phone] to please go over the results of her shoulder MRI. -KEITHD

## 2024-08-28 ENCOUNTER — APPOINTMENT (OUTPATIENT)
Dept: RHEUMATOLOGY | Facility: CLINIC | Age: 49
End: 2024-08-28
Payer: COMMERCIAL

## 2024-09-10 ENCOUNTER — APPOINTMENT (OUTPATIENT)
Dept: PHYSICAL THERAPY | Facility: CLINIC | Age: 49
End: 2024-09-10
Payer: COMMERCIAL

## 2024-09-11 ENCOUNTER — TREATMENT (OUTPATIENT)
Dept: PHYSICAL THERAPY | Facility: CLINIC | Age: 49
End: 2024-09-11
Payer: COMMERCIAL

## 2024-09-11 DIAGNOSIS — M25.612 DECREASED RANGE OF MOTION OF LEFT SHOULDER: ICD-10-CM

## 2024-09-11 DIAGNOSIS — M25.512 CHRONIC LEFT SHOULDER PAIN: ICD-10-CM

## 2024-09-11 DIAGNOSIS — M75.02 ADHESIVE CAPSULITIS OF LEFT SHOULDER: Primary | ICD-10-CM

## 2024-09-11 DIAGNOSIS — G89.29 CHRONIC LEFT SHOULDER PAIN: ICD-10-CM

## 2024-09-11 PROCEDURE — 97110 THERAPEUTIC EXERCISES: CPT | Mod: GP

## 2024-09-11 PROCEDURE — 97140 MANUAL THERAPY 1/> REGIONS: CPT | Mod: GP

## 2024-09-11 ASSESSMENT — PAIN - FUNCTIONAL ASSESSMENT: PAIN_FUNCTIONAL_ASSESSMENT: 0-10

## 2024-09-11 ASSESSMENT — PAIN SCALES - GENERAL: PAINLEVEL_OUTOF10: 0 - NO PAIN

## 2024-09-11 NOTE — PROGRESS NOTES
Physical Therapy  Physical Therapy Treatment    Patient Name: Theodora Montez  MRN: [id number]  Today's Date: 9/11/2024  Time Calculation  Start Time: 1730  Stop Time: 1815  Time Calculation (min): 45 min      PT Therapeutic Procedures Time Entry  Manual Therapy Time Entry: 25  Therapeutic Exercise Time Entry: 20          Insurance:  Visit number: 2 of 60  Authorization info: no auth  Insurance Type: Payor: [employer]ICAL [employer] [facility] / Plan: [employer]ICAL [employer] / Product Type: *No Product type* /      Current Problem  1. Adhesive capsulitis of left shoulder  Follow Up In Physical Therapy      2. Decreased range of motion of left shoulder  Follow Up In Physical Therapy      3. Chronic left shoulder pain  Follow Up In Physical Therapy            Referred by: Dr. Cheng    General:  General  Reason for Referral: L adhesive capsulitis  Referred By: Dr. Saxena    Precautions:  Precautions  STEADI Fall Risk Score (The score of 4 or more indicates an increased risk of falling): 0  Precautions Comment: hypertension, possible DM, CKD stage 2, fibromyalgia  Medical History Form: Reviewed (scanned into chart)    Subjective:   Patient reports she has been feeling better the past few days. Went to a slow flow yoga class and felt a lot of relief since. HEP is going well, no issues w/ performance thus far.     Pain:  Pain Assessment: 0-10  0-10 (Numeric) Pain Score: 0 - No pain (0 resting pain 5-7 w/ OH movements)    Objective:   ROM    Cervical AROM (Degrees)    Flexion: 60  Extension: 25  (L) Side Bend: 35  (R) Side Bend: 45  (L) Rotation: 45  (R) Rotation: 55      Shoulder AROM (Degrees) updated 9/11      (R)  (L)  Flexion: 180  155*  Abduction: 180  90*     Functional ER: C6  C1     Functional IR: T7  L5     *end range pain    Shoulder PROM (Degrees) updated 9/11      (R)  (L)  Flexion: 180  150*      Abduction: 180  105*     ER at 0:  90  30*     IR at 0:  belly  belly         ER at 90: 90  25*      IR at  "90: NT  NT      *end range pain    Elbow AROM (Degrees)      (R)  (L)  Flexion: wfl  wfl      Extension: wfl  wfl            Strength Testing  Deferred  Shoulder    (R)  (L)  Flexion:         Abduction:        ER:         IR:             Elbow    (R)  (L)  Flexion:          Extension:            Periscapular Musculature    (R)  (L)  Upper Traps:        Middle Traps:        Lower Traps:        Rhomboids:              Posture: mild scapular winging      Palpation: TTP       Joint Mobility: hypomobile GHJ      Observation: ecchymosis mostly resolved from muscle energy session w/ massage therapist.            Special Tests    Cervical    Cervical flexion rotation test:  ULTT  Median:  Radial:  Ulnar:  DNF endurance:  Alar ligament:  Sharp Frederick:      RTC/Impingement   Neer Impingement:    Diaz Elkin:    Empty Can:    Drop Arm:    Painful Arc:    Lift Off Test:   AC Joint   Cross Arm Test:   Biceps   Speeds Test:    Yergason Test:   SLAP   O'Brians Test:    Bicep load II:   Instability   Anterior Apprehension:    Posterior Apprehension:    Sulcus Test:   Thoracic Outlet   Adson's Test:    Shamir Test:        Radicular Symptoms:         Outcome Measures:        Treatments:    Manual Therapy: 25'  Trp subscap   GHJ inferior glides grade 1-2, poor tolerance, discontinued  Lat pin and stretch to tolerance  GHJ AP glides grade 1-2  Sidelying STJ mobs; lateral glides, superior and inferior rotations  STM posterior RTC soft tissue  Prone L CTJ manipulation    There-ex: 20'  UBE 3' fwd, 3\" bwd  Seated thoracic extension in chair 3x10  PROM FF, abduction, horizontal adduction, ER at 0, 45 and 90 to patient tolerance    * = added to HEP.  Sheet with exercise descriptions and images issued.  Skilled intervention utilized in the appropriate selection & application of above exercises.  Verbal and tactile cues provided for proper form and technique.  Pt. demonstrated appropriate form & verbalized understanding of optimal technique " for above exercises.      [facility].[employer]    Access Code: [email or web address]      EDUCATION: Home exercise program, plan of care, activity modifications, pain management, and injury pathology       Assessment:   Patient tolerated today's session w/ a reported reduction in familiar tightness. Demonstrates improvements in shoulder ROM quality and activity tolerance through self reporting of community exercise participation w/ a reported reduction in symptoms. Patient continues to have difficulty w/ shoulder ROM, particularly abduction and ER. Patient is progressing slowly and will benefit from ongoing PT services to continue progressive stretching, manual activities to improve ROM, and to initiate strengthening when appropriate to reach established goals to maximize functional mobility and return to PLOF.       Plan:  Continue progressive stretching, STM and mobilizations/manipulations to improve ROM. Trial of pulleys and standing sleeper stretch.     Goals: Set and discussed today  Active       Decreased ROM L shoulder       STGs (Progressing)       Start:  08/20/24    Expected End:  09/25/24       1. Demonstrate independence with home exercise program  2. Tolerate increased exercise without adverse reaction  3. Demonstrate improved posture & proper body mechanics throughout session  4. Increase FF and abduction ROM to at least 165 and 120 respectively to progress towards LTGs  5. Increase gross shoulder strength to at least 4-/5 to progress towards LTGs.         LTGs       Start:  08/20/24    Expected End:  10/15/24       1. Increase flexion and abduction ROM to pain free + 180 each to improve ADLs  2. Increase gross shoulder strength to pain free + 5/5  3. Decrease score of Quick DASH by > 8 points to meet the MCID  4. Perform ADLs without an increase symptoms  5. Increase shoulder total arc of rotation to at least 160 degs to improve ADLs  6. Increase periscapular strength to at least 4-/5 to improve  ADLs                Plan of care was developed with input and agreement by the patient      Rashi Mercado, PT, ATC

## 2024-09-16 ENCOUNTER — APPOINTMENT (OUTPATIENT)
Dept: PHYSICAL THERAPY | Facility: CLINIC | Age: 49
End: 2024-09-16
Payer: COMMERCIAL

## 2024-09-17 ENCOUNTER — APPOINTMENT (OUTPATIENT)
Dept: PRIMARY CARE | Facility: CLINIC | Age: 49
End: 2024-09-17
Payer: COMMERCIAL

## 2024-09-17 ENCOUNTER — TREATMENT (OUTPATIENT)
Dept: PHYSICAL THERAPY | Facility: CLINIC | Age: 49
End: 2024-09-17
Payer: COMMERCIAL

## 2024-09-17 DIAGNOSIS — G89.29 CHRONIC LEFT SHOULDER PAIN: ICD-10-CM

## 2024-09-17 DIAGNOSIS — M75.02 ADHESIVE CAPSULITIS OF LEFT SHOULDER: Primary | ICD-10-CM

## 2024-09-17 DIAGNOSIS — M25.612 DECREASED RANGE OF MOTION OF LEFT SHOULDER: ICD-10-CM

## 2024-09-17 DIAGNOSIS — M25.512 CHRONIC LEFT SHOULDER PAIN: ICD-10-CM

## 2024-09-17 PROCEDURE — 97140 MANUAL THERAPY 1/> REGIONS: CPT | Mod: GP

## 2024-09-17 PROCEDURE — 97016 VASOPNEUMATIC DEVICE THERAPY: CPT | Mod: GP

## 2024-09-17 PROCEDURE — 97110 THERAPEUTIC EXERCISES: CPT | Mod: GP

## 2024-09-17 ASSESSMENT — PAIN SCALES - GENERAL: PAINLEVEL_OUTOF10: 0 - NO PAIN

## 2024-09-17 ASSESSMENT — PAIN - FUNCTIONAL ASSESSMENT: PAIN_FUNCTIONAL_ASSESSMENT: 0-10

## 2024-09-17 NOTE — PROGRESS NOTES
Physical Therapy  Physical Therapy Treatment    Patient Name: Theodora Montez  MRN: [id number]  Today's Date: 9/17/2024  Time Calculation  Start Time: 1535  Stop Time: 1635  Time Calculation (min): 60 min      PT Therapeutic Procedures Time Entry  Manual Therapy Time Entry: 20  Therapeutic Exercise Time Entry: 25 PT Modalities Time Entry  Vasopneumatic Devices Time Entry: 15        Insurance:  Visit number: 3 of 60  Authorization info: no auth  Insurance Type: Payor: [employer] [facility] / Plan: [employer] / Product Type: *No Product type* /      Current Problem  1. Adhesive capsulitis of left shoulder  Follow Up In Physical Therapy      2. Decreased range of motion of left shoulder  Follow Up In Physical Therapy      3. Chronic left shoulder pain  Follow Up In Physical Therapy              Referred by: Dr. Cheng    General:  General  Reason for Referral: L adhesive capsulitis  Referred By: Dr. Saxena    Precautions:  Precautions  STEADI Fall Risk Score (The score of 4 or more indicates an increased risk of falling): 0  Precautions Comment: hypertension, possible DM, CKD stage 2, fibromyalgia  Medical History Form: Reviewed (scanned into chart)    Subjective:   Patient reports she is doing well. Has noticed some ADLs are improving and she is able to lift her arm more easily than she has in the past. Had a massage yesterday, felt it was helpful. Continues yoga to complement HEP.     Pain:  Pain Assessment: 0-10  0-10 (Numeric) Pain Score: 0 - No pain (0 at rest, 5 w/ OH movements)    Objective:   ROM    Cervical AROM (Degrees)    Flexion: 60  Extension: 25  (L) Side Bend: 35  (R) Side Bend: 45  (L) Rotation: 45  (R) Rotation: 55      Shoulder AROM (Degrees) updated 9/17      (R)  (L)  Flexion: 180  150*  Abduction: 180  80*     Functional ER: C6  C1     Functional IR: T7  L5     *end range pain    Shoulder PROM (Degrees) updated 9/17      (R)  (L)  Flexion: 180  165*      Abduction: 180  105*     ER  "at 0:  90  35*     IR at 0:  belly  belly         ER at 90: 90  35*      IR at 90: NT  NT      *end range pain    Elbow AROM (Degrees)      (R)  (L)  Flexion: wfl  wfl      Extension: wfl  wfl            Strength Testing  Deferred  Shoulder    (R)  (L)  Flexion:         Abduction:        ER:         IR:             Elbow    (R)  (L)  Flexion:          Extension:            Periscapular Musculature    (R)  (L)  Upper Traps:        Middle Traps:        Lower Traps:        Rhomboids:              Posture: mild scapular winging      Palpation: TTP       Joint Mobility: hypomobile GHJ      Observation: ecchymosis resolved          Special Tests    Cervical    Cervical flexion rotation test:  ULTT  Median:  Radial:  Ulnar:  DNF endurance:  Alar ligament:  Sharp Frederick:      RTC/Impingement   Neer Impingement:    Diaz Elkin:    Empty Can:    Drop Arm:    Painful Arc:    Lift Off Test:   AC Joint   Cross Arm Test:   Biceps   Speeds Test:    Yergason Test:   SLAP   O'Brians Test:    Bicep load II:   Instability   Anterior Apprehension:    Posterior Apprehension:    Sulcus Test:   Thoracic Outlet   Adson's Test:    Shamir Test:        Radicular Symptoms:         Outcome Measures:        Treatments:    Manual Therapy: 20'  GHJ inferior glides grade 1-2, improved tolerance, still not able to tolerate for long   GHJ AP glides grade 2-3  STM posterior RTC soft tissue    There-ex: 25'  UBE 3' fwd, 3' bwd  PROM FF, abduction, horizontal adduction, ER at 0, 45 and 90 to patient tolerance, as aggressive as possible  Modified sleeper stretch at wall w/ 5-6\" holds x10*  IR AAROM w/ strap, discontinued due to poor tolerance and pain production    * = added to HEP.  Sheet with exercise descriptions and images issued.  Skilled intervention utilized in the appropriate selection & application of above exercises.  Verbal and tactile cues provided for proper form and technique.  Pt. demonstrated appropriate form & verbalized understanding " of optimal technique for above exercises.      [facility].[employer]    Access Code: [email or web address]    Vaso-pneumatic Device: 15'  Game ready low compression 34 deg cold      EDUCATION: Home exercise program, plan of care, activity modifications, pain management, and injury pathology       Assessment:   Patient tolerated today's session w/ expected muscle soreness. Demonstrates improvements in FF, ER PROM, and tolerance to joint mobilizations. Continues to have difficulty w/ and deficits in shoulder ROM, RTC and scapular strength which limit her ADLs and exercise capacity. Patient is progressing slowly and will benefit from ongoing PT services to continue to progress ROM as tolerated and initiate strengthening when appropriate to reach established goals to maximize functional mobility and return to PLOF.        Plan:  Continue progressive stretching, STM and mobilizations/manipulations to improve ROM. Trial of gentle RTC isometrics.     Goals: Set and discussed today  Active       Decreased ROM L shoulder       STGs (Progressing)       Start:  08/20/24    Expected End:  09/25/24       1. Demonstrate independence with home exercise program  2. Tolerate increased exercise without adverse reaction  3. Demonstrate improved posture & proper body mechanics throughout session  4. Increase FF and abduction ROM to at least 165 and 120 respectively to progress towards LTGs  5. Increase gross shoulder strength to at least 4-/5 to progress towards LTGs.         LTGs       Start:  08/20/24    Expected End:  10/15/24       1. Increase flexion and abduction ROM to pain free + 180 each to improve ADLs  2. Increase gross shoulder strength to pain free + 5/5  3. Decrease score of Quick DASH by > 8 points to meet the MCID  4. Perform ADLs without an increase symptoms  5. Increase shoulder total arc of rotation to at least 160 degs to improve ADLs  6. Increase periscapular strength to at least 4-/5 to improve ADLs                 Plan of care was developed with input and agreement by the patient      Rashi Mercado, PT, ATC

## 2024-09-20 ENCOUNTER — APPOINTMENT (OUTPATIENT)
Dept: PRIMARY CARE | Facility: CLINIC | Age: 49
End: 2024-09-20
Payer: COMMERCIAL

## 2024-09-20 VITALS
HEIGHT: 62 IN | SYSTOLIC BLOOD PRESSURE: 118 MMHG | DIASTOLIC BLOOD PRESSURE: 76 MMHG | WEIGHT: 224 LBS | BODY MASS INDEX: 41.22 KG/M2

## 2024-09-20 DIAGNOSIS — F32.A DEPRESSION, UNSPECIFIED DEPRESSION TYPE: ICD-10-CM

## 2024-09-20 DIAGNOSIS — R79.89 ELEVATED SERUM CREATININE: ICD-10-CM

## 2024-09-20 DIAGNOSIS — Z00.00 HEALTHCARE MAINTENANCE: Primary | ICD-10-CM

## 2024-09-20 DIAGNOSIS — J98.01 BRONCHOSPASM: ICD-10-CM

## 2024-09-20 RX ORDER — BUPROPION HYDROCHLORIDE 300 MG/1
300 TABLET ORAL DAILY
Qty: 90 TABLET | Refills: 1 | Status: SHIPPED | OUTPATIENT
Start: 2024-09-20

## 2024-09-20 RX ORDER — BUPROPION HYDROCHLORIDE 150 MG/1
150 TABLET ORAL EVERY MORNING
Qty: 90 TABLET | Refills: 1 | Status: SHIPPED | OUTPATIENT
Start: 2024-09-20

## 2024-09-20 RX ORDER — ALBUTEROL SULFATE 90 UG/1
2 INHALANT RESPIRATORY (INHALATION) EVERY 6 HOURS PRN
Qty: 18 G | Refills: 2 | Status: SHIPPED | OUTPATIENT
Start: 2024-09-20 | End: 2025-09-20
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
## 2024-09-24 ENCOUNTER — APPOINTMENT (OUTPATIENT)
Dept: PHYSICAL THERAPY | Facility: CLINIC | Age: 49
End: 2024-09-24
Payer: COMMERCIAL

## 2024-09-25 ENCOUNTER — TREATMENT (OUTPATIENT)
Dept: PHYSICAL THERAPY | Facility: CLINIC | Age: 49
End: 2024-09-25
Payer: COMMERCIAL

## 2024-09-25 DIAGNOSIS — M75.02 ADHESIVE CAPSULITIS OF LEFT SHOULDER: Primary | ICD-10-CM

## 2024-09-25 DIAGNOSIS — M25.612 DECREASED RANGE OF MOTION OF LEFT SHOULDER: ICD-10-CM

## 2024-09-25 DIAGNOSIS — G89.29 CHRONIC LEFT SHOULDER PAIN: ICD-10-CM

## 2024-09-25 DIAGNOSIS — M25.512 CHRONIC LEFT SHOULDER PAIN: ICD-10-CM

## 2024-09-25 PROCEDURE — 97140 MANUAL THERAPY 1/> REGIONS: CPT | Mod: GP

## 2024-09-25 PROCEDURE — 97110 THERAPEUTIC EXERCISES: CPT | Mod: GP

## 2024-09-25 ASSESSMENT — PAIN SCALES - GENERAL: PAINLEVEL_OUTOF10: 0 - NO PAIN

## 2024-09-25 ASSESSMENT — PAIN - FUNCTIONAL ASSESSMENT: PAIN_FUNCTIONAL_ASSESSMENT: 0-10

## 2024-09-25 NOTE — PROGRESS NOTES
Physical Therapy  Physical Therapy Treatment    Patient Name: Theodora Montez  MRN: [id number]  Today's Date: 9/25/2024  Time Calculation  Start Time: 1745  Stop Time: 1828  Time Calculation (min): 43 min      PT Therapeutic Procedures Time Entry  Manual Therapy Time Entry: 18  Therapeutic Exercise Time Entry: 25          Insurance:  Visit number: 4 of 60  Authorization info: no auth  Insurance Type: Payor: [employer] [facility] / Plan: [employer] / Product Type: *No Product type* /      Current Problem  1. Adhesive capsulitis of left shoulder        2. Decreased range of motion of left shoulder        3. Chronic left shoulder pain              Referred by: Dr. Cheng    General:  General  Reason for Referral: L adhesive capsulitis  Referred By: Dr. Saxena    Precautions:  Precautions  STEADI Fall Risk Score (The score of 4 or more indicates an increased risk of falling): 0  Precautions Comment: hypertension, possible DM, CKD stage 2, fibromyalgia  Medical History Form: Reviewed (scanned into chart)    Subjective:   Patient reports she is feeling a little more sore today than usual. Has been keeping up w/ exercises and continues yoga as tolerated. Would like to try DN next session. Pain is slightly improving w/ OH movements.     Pain:  Pain Assessment: 0-10  0-10 (Numeric) Pain Score: 0 - No pain (4/10 pain w/ OH movements)    Objective:   ROM    Cervical AROM (Degrees)    Flexion: 60  Extension: 25  (L) Side Bend: 35  (R) Side Bend: 45  (L) Rotation: 45  (R) Rotation: 55      Shoulder AROM (Degrees) updated 9/25      (R)  (L)  Flexion: 180  130*  Abduction: 180  80*     Functional ER: C6  C1/30     Functional IR: T7  L5     *end range pain    Shoulder PROM (Degrees) updated 9/25      (R)  (L)  Flexion: 180  160*      Abduction: 180  90*     ER at 0:  90  25*     IR at 0:  belly  belly         ER at 90: 90  35*      IR at 90: NT  NT      *end range pain    Elbow AROM  "(Degrees)      (R)  (L)  Flexion: wfl  wfl      Extension: wfl  wfl            Strength Testing  Deferred  Shoulder    (R)  (L)  Flexion:         Abduction:        ER:         IR:             Elbow    (R)  (L)  Flexion:          Extension:            Periscapular Musculature    (R)  (L)  Upper Traps:        Middle Traps:        Lower Traps:        Rhomboids:              Posture: mild scapular winging      Palpation: TTP       Joint Mobility: hypomobile GHJ      Observation: ecchymosis resolved          Special Tests    Cervical    Cervical flexion rotation test:  ULTT  Median:  Radial:  Ulnar:  DNF endurance:  Alar ligament:  Sharp Frederick:      RTC/Impingement   Neer Impingement:    Diaz Elkin:    Empty Can:    Drop Arm:    Painful Arc:    Lift Off Test:   AC Joint   Cross Arm Test:   Biceps   Speeds Test:    Mauryrgason Test:   SLAP   O'Brians Test:    Bicep load II:   Instability   Anterior Apprehension:    Posterior Apprehension:    Sulcus Test:   Thoracic Outlet   Adson's Test:    Shamir Test:        Radicular Symptoms:         Outcome Measures:        Treatments:    Manual Therapy: 18'  STM-DTM deltoid, anterior shoulder/biceps, posterior shoulder/RTC    There-ex: 25'  UBE 3' fwd, 3' bwd  PROM FF, abduction, horizontal adduction, ER at 0, 45 and 90 to patient tolerance, as aggressive as possible.  Moist heat applied to posterior cuff during PROM  FF isometrics w/ 5-6\" holds x10*  Abduction isometrics w/ 5-6\" holds x10*  ER isometrics w/ 5-6\" holds x10*  IR isometrics w/ 5-6\" holds x10*    * = added to HEP.  Sheet with exercise descriptions and images issued.  Skilled intervention utilized in the appropriate selection & application of above exercises.  Verbal and tactile cues provided for proper form and technique.  Pt. demonstrated appropriate form & verbalized understanding of optimal technique for above exercises.      [email or web address]    Access Code: [email or web address]    Vaso-pneumatic Device: " 0'        EDUCATION: Home exercise program, plan of care, activity modifications, pain management, and injury pathology       Assessment:   Patient tolerated today's session fairly. Shoulder appeared to be more sensitive and stiff than usual, displayed decreased PROM and AROM compared to previous sessions. Appeared to respond fairly to isometrics. Continues to have deficits in shoulder ROM, RTC and scapular strength. Patient will benefit from ongoing PT services to progress ROM and strengthening activities as tolerated to reach established goals to return to PLOF and maximize functional mobility.          Plan:  Continue progressive stretching, STM and mobilizations/manipulations to improve ROM. Trial DN and gravity eliminated AROM, pulleys.    Goals: Set and discussed today  Active       Decreased ROM L shoulder       STGs (Progressing)       Start:  08/20/24    Expected End:  10/09/24       1. Demonstrate independence with home exercise program  2. Tolerate increased exercise without adverse reaction  3. Demonstrate improved posture & proper body mechanics throughout session  4. Increase FF and abduction ROM to at least 165 and 120 respectively to progress towards LTGs  5. Increase gross shoulder strength to at least 4-/5 to progress towards LTGs.         LTGs       Start:  08/20/24    Expected End:  10/23/24       1. Increase flexion and abduction ROM to pain free + 180 each to improve ADLs  2. Increase gross shoulder strength to pain free + 5/5  3. Decrease score of Quick DASH by > 8 points to meet the MCID  4. Perform ADLs without an increase symptoms  5. Increase shoulder total arc of rotation to at least 160 degs to improve ADLs  6. Increase periscapular strength to at least 4-/5 to improve ADLs                Plan of care was developed with input and agreement by the patient      Rashi Mercado, PT, ATC

## 2024-10-01 ENCOUNTER — APPOINTMENT (OUTPATIENT)
Dept: PHYSICAL THERAPY | Facility: CLINIC | Age: 49
End: 2024-10-01
Payer: COMMERCIAL

## 2024-10-09 ENCOUNTER — TREATMENT (OUTPATIENT)
Dept: PHYSICAL THERAPY | Facility: CLINIC | Age: 49
End: 2024-10-09
Payer: COMMERCIAL

## 2024-10-09 DIAGNOSIS — G89.29 CHRONIC LEFT SHOULDER PAIN: ICD-10-CM

## 2024-10-09 DIAGNOSIS — M25.612 DECREASED RANGE OF MOTION OF LEFT SHOULDER: ICD-10-CM

## 2024-10-09 DIAGNOSIS — M25.512 CHRONIC LEFT SHOULDER PAIN: ICD-10-CM

## 2024-10-09 DIAGNOSIS — M75.02 ADHESIVE CAPSULITIS OF LEFT SHOULDER: Primary | ICD-10-CM

## 2024-10-09 PROCEDURE — 97110 THERAPEUTIC EXERCISES: CPT | Mod: GP

## 2024-10-09 ASSESSMENT — PAIN - FUNCTIONAL ASSESSMENT: PAIN_FUNCTIONAL_ASSESSMENT: 0-10

## 2024-10-09 ASSESSMENT — PAIN SCALES - GENERAL: PAINLEVEL_OUTOF10: 0 - NO PAIN

## 2024-10-09 NOTE — PROGRESS NOTES
Physical Therapy  Physical Therapy Treatment    Patient Name: Theodora Montez  MRN: [id number]  Today's Date: 10/9/2024  Time Calculation  Start Time: 1730  Stop Time: 1810  Time Calculation (min): 40 min      PT Therapeutic Procedures Time Entry  Therapeutic Exercise Time Entry: 40          Insurance:  Visit number: 5 of 60  Authorization info: no auth  Insurance Type: Payor: [employer] [facility] / Plan: [employer] / Product Type: *No Product type* /      Current Problem  1. Adhesive capsulitis of left shoulder  Follow Up In Physical Therapy      2. Decreased range of motion of left shoulder  Follow Up In Physical Therapy      3. Chronic left shoulder pain  Follow Up In Physical Therapy              Referred by: Dr. Cheng    General:  General  Reason for Referral: L adhesive capsulitis  Referred By: Dr. Saxena    Precautions:  Precautions  STEADI Fall Risk Score (The score of 4 or more indicates an increased risk of falling): 0  Precautions Comment: hypertension, possible DM, CKD stage 2, fibromyalgia  Medical History Form: Reviewed (scanned into chart)    Subjective:   Patient reports she is feeling pretty sore and tender today. Has been continuing yoga, HEP and other activities as tolerated. Feels her ROM has improved since last session.     Pain:  Pain Assessment: 0-10  0-10 (Numeric) Pain Score: 0 - No pain (2-3 w/ OH movements)    Objective:   ROM    Cervical AROM (Degrees)    Flexion: 60  Extension: 25  (L) Side Bend: 35  (R) Side Bend: 45  (L) Rotation: 45  (R) Rotation: 55      Shoulder AROM (Degrees) updated 10/9      (R)  (L)  Flexion: 180  130*  Abduction: 180  90*, mild shrugging     Functional ER: C6  C1/50    Functional IR: T7  L5     *end range pain    Shoulder PROM (Degrees) updated 10/9      (R)  (L)  Flexion: 180  160*      Abduction: 180  90*     ER at 0:  90  45*     IR at 0:  belly  belly         ER at 90: 90  50*      IR at 90: NT  NT      *end range pain    Elbow AROM  (Degrees)      (R)  (L)  Flexion: wfl  wfl      Extension: wfl  wfl            Strength Testing updated 10/9  Shoulder    (R)  (L)  Flexion: 5  3+*      Abduction: 5  3+*     ER:  5  4-*     IR:  5  4*     *pain w/ RROM    Elbow    (R)  (L)  Flexion:          Extension:            Periscapular Musculature    (R)  (L)  Upper Traps:        Middle Traps:        Lower Traps:        Rhomboids:              Posture: mild scapular winging      Palpation: TTP       Joint Mobility: hypomobile GHJ      Observation: ecchymosis resolved          Special Tests    Cervical    Cervical flexion rotation test:  ULTT  Median:  Radial:  Ulnar:  DNF endurance:  Alar ligament:  Sharp Fredreick:      RTC/Impingement   Neer Impingement:    Diaz Elkin:    Empty Can:    Drop Arm:    Painful Arc:    Lift Off Test:   AC Joint   Cross Arm Test:   Biceps   Speeds Test:    Yergason Test:   SLAP   O'Brians Test:    Bicep load II:   Instability   Anterior Apprehension:    Posterior Apprehension:    Sulcus Test:   Thoracic Outlet   Adson's Test:    Shamir Test:        Radicular Symptoms:         Outcome Measures:        Treatments:    Manual Therapy: 0'      There-ex: 40'  UBE 3' fwd, 3' bwd  Pulleys x2', did not find beneficial, discontinued  PROM FF, abduction, ER at 0, 45 and 90 to patient tolerance, as aggressive as possible.  Physioball flexion rollouts in seated w/ end range holds 3x10  Physioball abduction rollouts in standing to plinth w/ end range holds 3x10  No monies w/ yellow perform better x12    [facility].[employer]    Access Code: [email or web address]    Vaso-pneumatic Device: 0'        EDUCATION: Home exercise program, plan of care, activity modifications, pain management, and injury pathology       Assessment:   Patient tolerated today's session w/ a reported reduction in familiar stiffness and soreness, as well as muscle fatigue during no monies. Demonstrates improvements in PROM for ER, as well as activity tolerance through  completion of full session w/o a production of familiar pain. Continues to have difficulty w/ OH ROM, particularly abduction. Also has deficits in RTC and scapular strength, all of which limit her ADLs. She will benefit from ongoing PT services to progress ROM and strengthening activities as tolerated to reach established goals to maximize functional mobility and return to PLOF.          Plan:  Continue progressive stretching, STM and mobilizations/manipulations to improve ROM. Trial DN and gravity eliminated AROM/strengthening.     Goals: Set and discussed today  Active       Decreased ROM L shoulder       STGs (Progressing)       Start:  08/20/24    Expected End:  10/30/24       1. Demonstrate independence with home exercise program  2. Tolerate increased exercise without adverse reaction  3. Demonstrate improved posture & proper body mechanics throughout session  4. Increase FF and abduction ROM to at least 165 and 120 respectively to progress towards LTGs  5. Increase gross shoulder strength to at least 4-/5 to progress towards LTGs.         LTGs       Start:  08/20/24    Expected End:  11/13/24       1. Increase flexion and abduction ROM to pain free + 180 each to improve ADLs  2. Increase gross shoulder strength to pain free + 5/5  3. Decrease score of Quick DASH by > 8 points to meet the MCID  4. Perform ADLs without an increase symptoms  5. Increase shoulder total arc of rotation to at least 160 degs to improve ADLs  6. Increase periscapular strength to at least 4-/5 to improve ADLs                Plan of care was developed with input and agreement by the patient      Rashi Mercado, PT, ATC

## 2024-11-04 ENCOUNTER — TREATMENT (OUTPATIENT)
Dept: PHYSICAL THERAPY | Facility: CLINIC | Age: 49
End: 2024-11-04
Payer: COMMERCIAL

## 2024-11-04 DIAGNOSIS — M25.612 DECREASED RANGE OF MOTION OF LEFT SHOULDER: ICD-10-CM

## 2024-11-04 DIAGNOSIS — M75.02 ADHESIVE CAPSULITIS OF LEFT SHOULDER: Primary | ICD-10-CM

## 2024-11-04 DIAGNOSIS — M25.512 CHRONIC LEFT SHOULDER PAIN: ICD-10-CM

## 2024-11-04 DIAGNOSIS — G89.29 CHRONIC LEFT SHOULDER PAIN: ICD-10-CM

## 2024-11-04 PROCEDURE — 97110 THERAPEUTIC EXERCISES: CPT | Mod: GP

## 2024-11-04 PROCEDURE — 97140 MANUAL THERAPY 1/> REGIONS: CPT | Mod: GP

## 2024-11-04 ASSESSMENT — PAIN SCALES - GENERAL: PAINLEVEL_OUTOF10: 0 - NO PAIN

## 2024-11-04 ASSESSMENT — PAIN - FUNCTIONAL ASSESSMENT: PAIN_FUNCTIONAL_ASSESSMENT: 0-10

## 2024-11-06 NOTE — PROGRESS NOTES
Physical Therapy  Physical Therapy Treatment    Patient Name: Theodora Montez  MRN: [id number]  Today's Date: 11/4/2024  Time Calculation  Start Time: 1525  Stop Time: 1605  Time Calculation (min): 40 min      PT Therapeutic Procedures Time Entry  Manual Therapy Time Entry: 20  Therapeutic Exercise Time Entry: 20          Insurance:  Visit number: 6 of 60  Authorization info: no auth  Insurance Type: Payor: [employer] [facility] / Plan: [employer] / Product Type: *No Product type* /      Current Problem  1. Adhesive capsulitis of left shoulder        2. Decreased range of motion of left shoulder        3. Chronic left shoulder pain              Referred by: Dr. Cheng    General:  General  Reason for Referral: L adhesive capsulitis  Referred By: Dr. Saxena    Precautions:  Precautions  STEADI Fall Risk Score (The score of 4 or more indicates an increased risk of falling): 0  Precautions Comment: hypertension, possible DM, CKD stage 2, fibromyalgia  Medical History Form: Reviewed (scanned into chart)    Subjective:   Patient reports she is feeling a little more sore than usual. Has continued yoga, MET and massage. Feels she is improving slowly although she does feel more stiff today than she has been in the past few days.     Pain:  Pain Assessment: 0-10  0-10 (Numeric) Pain Score: 0 - No pain (2-3 w/ OH movements)    Objective:   ROM    Cervical AROM (Degrees)    Flexion: 60  Extension: 25  (L) Side Bend: 35  (R) Side Bend: 45  (L) Rotation: 45  (R) Rotation: 55      Shoulder AROM (Degrees) updated 11/4      (R)  (L)  Flexion: 180  135*  Abduction: 180  90*, mild shrugging     Functional ER: C6  60    Functional IR: T7  L5     *end range pain    Shoulder PROM (Degrees) updated 11/4      (R)  (L)  Flexion: 180  160*      Abduction: 180  90*     ER at 0:  90  60     IR at 0:  belly  belly         ER at 90: 90  50*      IR at 90: NT  NT      *end range pain    Elbow AROM  "(Degrees)      (R)  (L)  Flexion: wfl  wfl      Extension: wfl  wfl            Strength Testing updated 10/9  Shoulder    (R)  (L)  Flexion: 5  3+*      Abduction: 5  3+*     ER:  5  4-*     IR:  5  4*     *pain w/ RROM    Elbow    (R)  (L)  Flexion:          Extension:            Periscapular Musculature    (R)  (L)  Upper Traps:        Middle Traps:        Lower Traps:        Rhomboids:              Posture: mild scapular winging      Palpation: TTP       Joint Mobility: hypomobile GHJ      Observation: ecchymosis resolved          Special Tests    Cervical    Cervical flexion rotation test:  ULTT  Median:  Radial:  Ulnar:  DNF endurance:  Alar ligament:  Sharp Frederick:      RTC/Impingement   Neer Impingement:    Diaz Elkin:    Empty Can:    Drop Arm:    Painful Arc:    Lift Off Test:   AC Joint   Cross Arm Test:   Biceps   Speeds Test:    Yergason Test:   SLAP   O'Brians Test:    Bicep load II:   Instability   Anterior Apprehension:    Posterior Apprehension:    Sulcus Test:   Thoracic Outlet   Adson's Test:    Shamir Test:        Radicular Symptoms:         Outcome Measures:        Treatments:    Manual Therapy: 20'  GHJ inferior glides grade 2-3  GHJ AP glides grade 3-4  PROM FF, abduction, ER at 0, 45 and 90 to patient tolerance, as aggressive as possible.    There-ex: 20'  UBE 3' fwd, 3' bwd  Wall walks abduction w/ end range hold 2x30\"   Wall walks FF w/ end range hold 2x30\"  ER doorway stretch 2x30\"   Standing abduction pulley x1'  Banded lat stretch w/ yellow perform better TB 3x20\"    [facility].[employer]    Access Code: [email or web address]    Vaso-pneumatic Device: 0'        EDUCATION: Home exercise program, plan of care, activity modifications, pain management, and injury pathology       Assessment:   Patient tolerated today's session w/ a reported reduction in familiar stiffness and tightness. Demonstrates improvements in shoulder AROM quality as well as tolerance to mobs. Patient continues to " have deficits in ROM, as well as RTC and scapular strength. She is progressing slowly and will benefit from ongoing PT services to continue ROM and strengthening progression as tolerated to reach established goals to maximize functional mobility and return to PLOF.          Plan:  Continue progressive stretching, STM and mobilizations/manipulations to improve ROM. Trial DN and gravity eliminated AROM/strengthening.     Goals: Set and discussed today  Active       Decreased ROM L shoulder       STGs (Progressing)       Start:  08/20/24    Expected End:  11/25/24       1. Demonstrate independence with home exercise program  2. Tolerate increased exercise without adverse reaction  3. Demonstrate improved posture & proper body mechanics throughout session  4. Increase FF and abduction ROM to at least 165 and 120 respectively to progress towards LTGs  5. Increase gross shoulder strength to at least 4-/5 to progress towards LTGs.         LTGs       Start:  08/20/24    Expected End:  12/09/24       1. Increase flexion and abduction ROM to pain free + 180 each to improve ADLs  2. Increase gross shoulder strength to pain free + 5/5  3. Decrease score of Quick DASH by > 8 points to meet the MCID  4. Perform ADLs without an increase symptoms  5. Increase shoulder total arc of rotation to at least 160 degs to improve ADLs  6. Increase periscapular strength to at least 4-/5 to improve ADLs                Plan of care was developed with input and agreement by the patient      Rashi Mercado, PT, ATC

## 2024-11-26 DIAGNOSIS — E66.9 OBESITY, UNSPECIFIED CLASS, UNSPECIFIED OBESITY TYPE, UNSPECIFIED WHETHER SERIOUS COMORBIDITY PRESENT: Primary | ICD-10-CM

## 2024-12-03 ENCOUNTER — APPOINTMENT (OUTPATIENT)
Dept: ENDOCRINOLOGY | Facility: CLINIC | Age: 49
End: 2024-12-03
Payer: COMMERCIAL

## 2024-12-03 ENCOUNTER — LAB (OUTPATIENT)
Dept: LAB | Facility: LAB | Age: 49
End: 2024-12-03
Payer: COMMERCIAL

## 2024-12-03 VITALS
WEIGHT: 221 LBS | BODY MASS INDEX: 40.67 KG/M2 | DIASTOLIC BLOOD PRESSURE: 76 MMHG | SYSTOLIC BLOOD PRESSURE: 118 MMHG | HEIGHT: 62 IN

## 2024-12-03 DIAGNOSIS — Z00.00 HEALTHCARE MAINTENANCE: ICD-10-CM

## 2024-12-03 DIAGNOSIS — L65.9 ALOPECIA: Primary | ICD-10-CM

## 2024-12-03 DIAGNOSIS — E78.5 HYPERLIPIDEMIA, UNSPECIFIED HYPERLIPIDEMIA TYPE: ICD-10-CM

## 2024-12-03 DIAGNOSIS — G47.33 OSA (OBSTRUCTIVE SLEEP APNEA): ICD-10-CM

## 2024-12-03 DIAGNOSIS — I10 HYPERTENSION, UNSPECIFIED TYPE: ICD-10-CM

## 2024-12-03 LAB
25(OH)D3 SERPL-MCNC: 56 NG/ML (ref 30–100)
ANION GAP SERPL CALC-SCNC: 12 MMOL/L (ref 10–20)
ANION GAP SERPL CALC-SCNC: 14 MMOL/L (ref 10–20)
BUN SERPL-MCNC: 18 MG/DL (ref 6–23)
BUN SERPL-MCNC: 18 MG/DL (ref 6–23)
CALCIUM SERPL-MCNC: 9.4 MG/DL (ref 8.6–10.6)
CALCIUM SERPL-MCNC: 9.4 MG/DL (ref 8.6–10.6)
CHLORIDE SERPL-SCNC: 105 MMOL/L (ref 98–107)
CHLORIDE SERPL-SCNC: 106 MMOL/L (ref 98–107)
CHOLEST SERPL-MCNC: 161 MG/DL (ref 0–199)
CHOLESTEROL/HDL RATIO: 2.8
CO2 SERPL-SCNC: 24 MMOL/L (ref 21–32)
CO2 SERPL-SCNC: 24 MMOL/L (ref 21–32)
CORTIS SERPL-MCNC: 8.2 UG/DL (ref 2.5–20)
CREAT SERPL-MCNC: 1.16 MG/DL (ref 0.5–1.05)
CREAT SERPL-MCNC: 1.18 MG/DL (ref 0.5–1.05)
EGFRCR SERPLBLD CKD-EPI 2021: 57 ML/MIN/1.73M*2
EGFRCR SERPLBLD CKD-EPI 2021: 58 ML/MIN/1.73M*2
GLUCOSE SERPL-MCNC: 81 MG/DL (ref 74–99)
GLUCOSE SERPL-MCNC: 82 MG/DL (ref 74–99)
HDLC SERPL-MCNC: 56.6 MG/DL
INSULIN SERPL-ACNC: 19 UIU/ML (ref 3–25)
LDLC SERPL CALC-MCNC: 81 MG/DL
NON HDL CHOLESTEROL: 104 MG/DL (ref 0–149)
POTASSIUM SERPL-SCNC: 4.3 MMOL/L (ref 3.5–5.3)
POTASSIUM SERPL-SCNC: 4.3 MMOL/L (ref 3.5–5.3)
SODIUM SERPL-SCNC: 138 MMOL/L (ref 136–145)
SODIUM SERPL-SCNC: 139 MMOL/L (ref 136–145)
T3FREE SERPL-MCNC: 3.2 PG/ML (ref 2.3–4.2)
T4 FREE SERPL-MCNC: 1.27 NG/DL (ref 0.78–1.48)
T4 FREE SERPL-MCNC: 1.3 NG/DL (ref 0.78–1.48)
THYROPEROXIDASE AB SERPL-ACNC: 35 IU/ML
TRIGL SERPL-MCNC: 116 MG/DL (ref 0–149)
TSH SERPL-ACNC: 2.35 MIU/L (ref 0.44–3.98)
TSH SERPL-ACNC: 2.41 MIU/L (ref 0.44–3.98)
VLDL: 23 MG/DL (ref 0–40)

## 2024-12-03 PROCEDURE — 80061 LIPID PANEL: CPT

## 2024-12-03 PROCEDURE — 3008F BODY MASS INDEX DOCD: CPT | Performed by: INTERNAL MEDICINE

## 2024-12-03 PROCEDURE — 3074F SYST BP LT 130 MM HG: CPT | Performed by: INTERNAL MEDICINE

## 2024-12-03 PROCEDURE — 84439 ASSAY OF FREE THYROXINE: CPT

## 2024-12-03 PROCEDURE — 82627 DEHYDROEPIANDROSTERONE: CPT

## 2024-12-03 PROCEDURE — 99204 OFFICE O/P NEW MOD 45 MIN: CPT | Performed by: INTERNAL MEDICINE

## 2024-12-03 PROCEDURE — 86376 MICROSOMAL ANTIBODY EACH: CPT

## 2024-12-03 PROCEDURE — 84481 FREE ASSAY (FT-3): CPT

## 2024-12-03 PROCEDURE — 84443 ASSAY THYROID STIM HORMONE: CPT

## 2024-12-03 PROCEDURE — 80048 BASIC METABOLIC PNL TOTAL CA: CPT

## 2024-12-03 PROCEDURE — 85025 COMPLETE CBC W/AUTO DIFF WBC: CPT

## 2024-12-03 PROCEDURE — 83036 HEMOGLOBIN GLYCOSYLATED A1C: CPT

## 2024-12-03 PROCEDURE — 82533 TOTAL CORTISOL: CPT

## 2024-12-03 PROCEDURE — 3078F DIAST BP <80 MM HG: CPT | Performed by: INTERNAL MEDICINE

## 2024-12-03 PROCEDURE — 83525 ASSAY OF INSULIN: CPT

## 2024-12-03 PROCEDURE — 84146 ASSAY OF PROLACTIN: CPT

## 2024-12-03 PROCEDURE — 82306 VITAMIN D 25 HYDROXY: CPT

## 2024-12-03 NOTE — PROGRESS NOTES
Patient ID: Theodora Montez is a 49 y.o. female who presents for New Patient Visit (Endocrine consult. Referred by Dr.Seth Cheng for her thyroid.).  HPI  The patient was referred for evaluation of alopecia and fatigue.    This is a 49-year-old female who states that she has had fibromyalgia for years.    She states that the symptoms she has been having now for the last 1.5 years are different.    She is complaining of thinning hair and significant fatigue.    She states she has struggled with weight although more recently she has been able to lose about 70 pounds.    She states that her thyroid has been normal but she thinks her thyroid may not be normal despite the normal testing.    Her periods previously were irregular but have been more regular more recently.    She has noted an increase in sunburn growth but no other hirsutism.    She has not noted any acne.    Her symptoms have not improved with her weight loss.    She has a past history of hypertension hyperlipidemia recurrent COVID renal insufficiency sleep apnea that she states is currently being untreated as she lost weight it resolved.    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    Family history positive diabetes in a mother grandmother uncle cause him thyroid in a cousin.    ROS  Comprehensive review of systems is negative.    Objective   Physical Exam  Visit Vitals  /76      Vitals:    12/03/24 1506   Weight: 100 kg (221 lb)      Body mass index is 40.42 kg/m².      Alert and oriented x3  In no distress  No focal neurologic deficits  No supraclavicular, or dorsal fat  No purple striae  Integument intact  Eyes normal  ENT normal. No adenopathy  Thyroid palpable and normal. No nodules  Chest clear to auscultation  Heart sounds are normal  Abdomen nontender. Bowel sounds normal. No organomegaly  Feet are okay  Reflexes normal with normal return    Current Outpatient Medications   Medication Sig Dispense  Refill    albuterol 90 mcg/actuation inhaler Inhale 2 puffs every 6 hours if needed for wheezing. 18 g 2    b complex vitamins capsule TAKE 1 CAPSULE BY MOUTH TWICE A  capsule 4    buPROPion XL (Wellbutrin XL) 150 mg 24 hr tablet Take 1 tablet (150 mg) by mouth once daily in the morning. 90 tablet 1    buPROPion XL (Wellbutrin XL) 300 mg 24 hr tablet Take 1 tablet (300 mg) by mouth once daily. Do not crush, chew, or split. 90 tablet 1    coenzyme Q-10 200 mg capsule Take 1 capsule (200 mg) by mouth 3 times a day. 270 capsule 4    lisinopril 20 mg tablet TAKE 1 TABLET BY MOUTH EVERY DAY 90 tablet 1    milnacipran (Savella) 50 MG tablet Take 2 tablets (100 mg) by mouth 2 times a day. 180 tablet 4    semaglutide 0.25 mg or 0.5 mg (2 mg/3 mL) pen injector Inject 0.5 mg under the skin 1 (one) time per week. 9 mL 1    topiramate (Topamax) 50 mg tablet Take 2 tablets (100 mg) by mouth once daily.      atorvastatin (Lipitor) 10 mg tablet Take 1 tablet (10 mg) by mouth once daily. 90 tablet 1    gabapentin (Neurontin) 100 mg capsule Take 2 capsules (200 mg) by mouth 3 times a day. 270 capsule 1     No current facility-administered medications for this visit.       Assessment/Plan     1.  Alopecia  2.  Fatigue  3.  Hypertension  4.  Hyperlipidemia    We discussed endocrine causes of alopecia.    We reviewed her previous blood work.    We discussed the possibility of insulin resistance however if her symptoms were related the fact that she has lost weight and is now taking Ozempic should have improved those symptoms.    We discussed the option of trying a therapeutic trial of metformin to see if it has any impact.    We discussed androgens.    Will check BMP insulin level TSH free T4 free T3 thyroperoxidase antibody cortisol free and total testosterone 17-hydroxyprogesterone prolactin and DHEA-S.    We discussed the most common cause of hair loss being stress.    We discussed the possibility that the fatigue is related  to untreated sleep apnea despite her weight loss.    Follow-up will be dependent on the results.

## 2024-12-04 ENCOUNTER — TREATMENT (OUTPATIENT)
Dept: PHYSICAL THERAPY | Facility: CLINIC | Age: 49
End: 2024-12-04
Payer: COMMERCIAL

## 2024-12-04 DIAGNOSIS — M75.02 ADHESIVE CAPSULITIS OF LEFT SHOULDER: Primary | ICD-10-CM

## 2024-12-04 DIAGNOSIS — M25.612 DECREASED RANGE OF MOTION OF LEFT SHOULDER: ICD-10-CM

## 2024-12-04 DIAGNOSIS — G89.29 CHRONIC LEFT SHOULDER PAIN: ICD-10-CM

## 2024-12-04 DIAGNOSIS — M25.512 CHRONIC LEFT SHOULDER PAIN: ICD-10-CM

## 2024-12-04 LAB
BASOPHILS # BLD AUTO: 0.05 X10*3/UL (ref 0–0.1)
BASOPHILS NFR BLD AUTO: 0.4 %
DHEA-S SERPL-MCNC: 75 UG/DL (ref 12–379)
EOSINOPHIL # BLD AUTO: 0.22 X10*3/UL (ref 0–0.7)
EOSINOPHIL NFR BLD AUTO: 1.9 %
ERYTHROCYTE [DISTWIDTH] IN BLOOD BY AUTOMATED COUNT: 12 % (ref 11.5–14.5)
EST. AVERAGE GLUCOSE BLD GHB EST-MCNC: 97 MG/DL
HBA1C MFR BLD: 5 %
HCT VFR BLD AUTO: 44.3 % (ref 36–46)
HGB BLD-MCNC: 14.1 G/DL (ref 12–16)
IMM GRANULOCYTES # BLD AUTO: 0.05 X10*3/UL (ref 0–0.7)
IMM GRANULOCYTES NFR BLD AUTO: 0.4 % (ref 0–0.9)
LYMPHOCYTES # BLD AUTO: 3.01 X10*3/UL (ref 1.2–4.8)
LYMPHOCYTES NFR BLD AUTO: 25.8 %
MCH RBC QN AUTO: 30.3 PG (ref 26–34)
MCHC RBC AUTO-ENTMCNC: 31.8 G/DL (ref 32–36)
MCV RBC AUTO: 95 FL (ref 80–100)
MONOCYTES # BLD AUTO: 0.84 X10*3/UL (ref 0.1–1)
MONOCYTES NFR BLD AUTO: 7.2 %
NEUTROPHILS # BLD AUTO: 7.5 X10*3/UL (ref 1.2–7.7)
NEUTROPHILS NFR BLD AUTO: 64.3 %
NRBC BLD-RTO: 0 /100 WBCS (ref 0–0)
PLATELET # BLD AUTO: 419 X10*3/UL (ref 150–450)
PROLACTIN SERPL-MCNC: 8.4 UG/L (ref 3–20)
RBC # BLD AUTO: 4.65 X10*6/UL (ref 4–5.2)
WBC # BLD AUTO: 11.7 X10*3/UL (ref 4.4–11.3)

## 2024-12-04 PROCEDURE — 97110 THERAPEUTIC EXERCISES: CPT | Mod: GP

## 2024-12-04 PROCEDURE — 97140 MANUAL THERAPY 1/> REGIONS: CPT | Mod: GP

## 2024-12-04 ASSESSMENT — PAIN - FUNCTIONAL ASSESSMENT: PAIN_FUNCTIONAL_ASSESSMENT: 0-10

## 2024-12-04 ASSESSMENT — PAIN SCALES - GENERAL: PAINLEVEL_OUTOF10: 0 - NO PAIN

## 2024-12-04 NOTE — PROGRESS NOTES
Physical Therapy  Physical Therapy Treatment    Patient Name: Theodora Montez  MRN: [id number]  Today's Date: 12/4/2024  Time Calculation  Start Time: 1740  Stop Time: 1820  Time Calculation (min): 40 min      PT Therapeutic Procedures Time Entry  Manual Therapy Time Entry: 20  Therapeutic Exercise Time Entry: 20          Insurance:  Visit number: 7 of 60  Authorization info: no auth  Insurance Type: Payor: [employer] [facility] / Plan: [employer] / Product Type: *No Product type* /      Current Problem  1. Adhesive capsulitis of left shoulder  Follow Up In Physical Therapy      2. Decreased range of motion of left shoulder  Follow Up In Physical Therapy      3. Chronic left shoulder pain  Follow Up In Physical Therapy              Referred by: Dr. Cheng    General:  General  Reason for Referral: L adhesive capsulitis  Referred By: Dr. Saxena    Precautions:  Precautions  Precautions Comment: hypertension, possible DM, CKD stage 2, fibromyalgia  Medical History Form: Reviewed (scanned into chart)    Subjective:   Patient reports she feels her ROM has taken a step backwards since her last visit. Has not been as diligent w/ stretching or working w/ other providers the past few weeks due to other issues she has had to deal with.     Pain:  Pain Assessment: 0-10  0-10 (Numeric) Pain Score: 0 - No pain (no number given, moderate amount of discomfort w/ ROM and to palpation)    Objective:   ROM    Cervical AROM (Degrees)    Flexion: 60  Extension: 25  (L) Side Bend: 35  (R) Side Bend: 45  (L) Rotation: 45  (R) Rotation: 55      Shoulder AROM (Degrees) updated 12/4      (R)  (L)  Flexion: 180  130*  Abduction: 180  85-90*    Functional ER: C6  50*   Functional IR: T7  L5     *end range pain    Shoulder PROM (Degrees) updated 12/4      (R)  (L)  Flexion: 180  160*      Abduction: 180  90*     ER at 0:  90  40*     IR at 0:  belly  belly         ER at 90: 90  40*      IR at 90: NT  NT      *end range pain,  "measurements taken post tx, pre tx all numbers 5-10 degs less    Elbow AROM (Degrees)      (R)  (L)  Flexion: wfl  wfl      Extension: wfl  wfl            Strength Testing updated 12/4  Shoulder    (R)  (L)  Flexion: 5  3+*      Abduction: 5  3+*     ER:  5  4-*     IR:  5  4*     *pain w/ RROM    Elbow    (R)  (L)  Flexion:          Extension:            Periscapular Musculature    (R)  (L)  Upper Traps:        Middle Traps:        Lower Traps:        Rhomboids:              Posture: mild scapular winging      Palpation: TTP       Joint Mobility: hypomobile GHJ      Observation: ecchymosis resolved          Special Tests    Cervical    Cervical flexion rotation test:  ULTT  Median:  Radial:  Ulnar:  DNF endurance:  Alar ligament:  Sharp Frederick:      RTC/Impingement   Neer Impingement:    Diaz Elkin:    Empty Can:    Drop Arm:    Painful Arc:    Lift Off Test:   AC Joint   Cross Arm Test:   Biceps   Speeds Test:    Yergason Test:   SLAP   O'Brians Test:    Bicep load II:   Instability   Anterior Apprehension:    Posterior Apprehension:    Sulcus Test:   Thoracic Outlet   Adson's Test:    Shamir Test:        Radicular Symptoms:         Outcome Measures:        Treatments:    Manual Therapy:20'  GHJ inferior glides grade 2-3 in sidelying and supine  IASTM to posterior cuff in sidelying w/ theragun  STM subscap in supine to tolerance    There-ex: 20'  UBE 3' fwd, 3' bwd  PROM FF, abduction, ER at 0, 45 and 90 to patient tolerance, as aggressive as possible.  Banded lat stretch w/ black perform better TB x30\", poor tolerance, discontinued  Physioball flexion rollouts in seated w/ end range holds 3x10  Physioball abduction rollouts in seated w/ end range holds 2x10    [facility].[employer]    Access Code: [email or web address]    Vaso-pneumatic Device: 0'        EDUCATION: Home exercise program, plan of care, activity modifications, pain management, and injury pathology       Assessment:   Patient tolerated today's " session w/ expected muscle soreness and familiar pain w/ OH ROM activities. Demonstrates improvements in ROM post session. Continues to have moderate deficits in shoulder ROM, as well as activity tolerance due to pain and RTC and scapular weakness limiting her ability to perform ADLs. Patient is progressing slowly, despite recent mild setback due to decreased frequency of ROM activities. She will benefit from ongoing PT services to continue to progress ROM and RTC and scapular strengthening activities as tolerated to reach established goals to PLOF.          Plan:  Patient is going to inquire w/ referring ortho about possible cortizone injection. Continue PROM and AAROM activities, and manual as tolerated. Emphasis on ER, abduction activities.     Goals: Set and discussed today  Active       Decreased ROM L shoulder       STGs (Progressing)       Start:  08/20/24    Expected End:  12/25/24       1. Demonstrate independence with home exercise program  2. Tolerate increased exercise without adverse reaction  3. Demonstrate improved posture & proper body mechanics throughout session  4. Increase FF and abduction ROM to at least 165 and 120 respectively to progress towards LTGs  5. Increase gross shoulder strength to at least 4-/5 to progress towards LTGs.         LTGs (Not Progressing)       Start:  08/20/24    Expected End:  01/08/25       1. Increase flexion and abduction ROM to pain free + 180 each to improve ADLs  2. Increase gross shoulder strength to pain free + 5/5  3. Decrease score of Quick DASH by > 8 points to meet the MCID  4. Perform ADLs without an increase symptoms  5. Increase shoulder total arc of rotation to at least 160 degs to improve ADLs  6. Increase periscapular strength to at least 4-/5 to improve ADLs                Plan of care was developed with input and agreement by the patient      Rashi Mercado, PT, ATC

## 2024-12-04 NOTE — RESULT ENCOUNTER NOTE
Please inform patient thyroid is normal.  Kidney function is still slightly abnormal.  Her initial evaluation is basically normal.  There are some other tests still pending.  In the meantime she can try the metformin per protocol and follow-up in 2 to 3 months.

## 2024-12-06 LAB — ANDROST SERPL-MCNC: 0.58 NG/ML (ref 0.13–0.82)

## 2024-12-07 LAB
17OHP SERPL-MCNC: 117.41 NG/DL
TESTOSTERONE FREE (CHAN): 3.1 PG/ML (ref 0.1–6.4)
TESTOSTERONE,TOTAL,LC-MS/MS: 25 NG/DL (ref 2–45)

## 2024-12-09 DIAGNOSIS — N94.6 DYSMENORRHEA: Primary | ICD-10-CM

## 2024-12-09 RX ORDER — METFORMIN HYDROCHLORIDE 1000 MG/1
1000 TABLET ORAL
Qty: 60 TABLET | Refills: 5 | Status: SHIPPED | OUTPATIENT
Start: 2024-12-09 | End: 2025-12-09

## 2024-12-10 ENCOUNTER — APPOINTMENT (OUTPATIENT)
Dept: PRIMARY CARE | Facility: CLINIC | Age: 49
End: 2024-12-10
Payer: COMMERCIAL

## 2024-12-10 VITALS — BODY MASS INDEX: 39.87 KG/M2 | DIASTOLIC BLOOD PRESSURE: 80 MMHG | WEIGHT: 218 LBS | SYSTOLIC BLOOD PRESSURE: 100 MMHG

## 2024-12-10 DIAGNOSIS — H57.89 EYE DISCHARGE: ICD-10-CM

## 2024-12-10 DIAGNOSIS — M62.838 MUSCLE SPASM: ICD-10-CM

## 2024-12-10 DIAGNOSIS — M43.06 LUMBAR SPONDYLOLYSIS: ICD-10-CM

## 2024-12-10 DIAGNOSIS — E78.5 HYPERLIPIDEMIA, UNSPECIFIED HYPERLIPIDEMIA TYPE: ICD-10-CM

## 2024-12-10 DIAGNOSIS — M25.512 CHRONIC LEFT SHOULDER PAIN: ICD-10-CM

## 2024-12-10 DIAGNOSIS — Z00.00 HEALTHCARE MAINTENANCE: Primary | ICD-10-CM

## 2024-12-10 DIAGNOSIS — R20.2 PARESTHESIAS: ICD-10-CM

## 2024-12-10 DIAGNOSIS — M43.17 ACQUIRED SPONDYLOLISTHESIS OF LUMBOSACRAL REGION: ICD-10-CM

## 2024-12-10 DIAGNOSIS — M79.7 FIBROMYALGIA: ICD-10-CM

## 2024-12-10 DIAGNOSIS — M54.16 LUMBAR RADICULOPATHY: ICD-10-CM

## 2024-12-10 DIAGNOSIS — G89.29 CHRONIC LEFT SHOULDER PAIN: ICD-10-CM

## 2024-12-10 DIAGNOSIS — I10 PRIMARY HYPERTENSION: ICD-10-CM

## 2024-12-10 DIAGNOSIS — M54.2 CERVICALGIA: ICD-10-CM

## 2024-12-10 DIAGNOSIS — E66.9 OBESITY, UNSPECIFIED CLASS, UNSPECIFIED OBESITY TYPE, UNSPECIFIED WHETHER SERIOUS COMORBIDITY PRESENT: ICD-10-CM

## 2024-12-10 DIAGNOSIS — M17.10 ARTHRITIS OF KNEE: ICD-10-CM

## 2024-12-10 DIAGNOSIS — G89.4 CHRONIC PAIN SYNDROME: ICD-10-CM

## 2024-12-10 DIAGNOSIS — H93.A1 PULSATILE TINNITUS OF RIGHT EAR: ICD-10-CM

## 2024-12-10 DIAGNOSIS — F32.A DEPRESSION, UNSPECIFIED DEPRESSION TYPE: ICD-10-CM

## 2024-12-10 DIAGNOSIS — H53.9 CHANGE IN VISION: ICD-10-CM

## 2024-12-10 PROCEDURE — 3074F SYST BP LT 130 MM HG: CPT | Performed by: INTERNAL MEDICINE

## 2024-12-10 PROCEDURE — 99396 PREV VISIT EST AGE 40-64: CPT | Performed by: INTERNAL MEDICINE

## 2024-12-10 PROCEDURE — 3079F DIAST BP 80-89 MM HG: CPT | Performed by: INTERNAL MEDICINE

## 2024-12-10 PROCEDURE — 1036F TOBACCO NON-USER: CPT | Performed by: INTERNAL MEDICINE

## 2024-12-10 RX ORDER — ATORVASTATIN CALCIUM 10 MG/1
10 TABLET, FILM COATED ORAL DAILY
Qty: 90 TABLET | Refills: 1 | Status: SHIPPED | OUTPATIENT
Start: 2024-12-10 | End: 2025-03-10

## 2024-12-10 RX ORDER — LISINOPRIL 20 MG/1
20 TABLET ORAL DAILY
Qty: 90 TABLET | Refills: 1 | Status: CANCELLED | OUTPATIENT
Start: 2024-12-10

## 2024-12-10 RX ORDER — TOPIRAMATE 50 MG/1
100 TABLET, FILM COATED ORAL DAILY
Qty: 180 TABLET | Refills: 1 | Status: SHIPPED | OUTPATIENT
Start: 2024-12-10 | End: 2025-03-10

## 2024-12-10 RX ORDER — CIPROFLOXACIN HYDROCHLORIDE 3 MG/ML
1 SOLUTION/ DROPS OPHTHALMIC
Qty: 10 ML | Refills: 2 | Status: SHIPPED | OUTPATIENT
Start: 2024-12-10 | End: 2024-12-20

## 2024-12-10 RX ORDER — GABAPENTIN 100 MG/1
200 CAPSULE ORAL 3 TIMES DAILY
Qty: 270 CAPSULE | Refills: 7 | OUTPATIENT
Start: 2024-12-10

## 2024-12-10 RX ORDER — AMLODIPINE BESYLATE 2.5 MG/1
2.5 TABLET ORAL DAILY
Qty: 90 TABLET | Refills: 1 | Status: SHIPPED | OUTPATIENT
Start: 2024-12-10 | End: 2024-12-10

## 2024-12-10 RX ORDER — BUPROPION HYDROCHLORIDE 300 MG/1
300 TABLET ORAL DAILY
Qty: 90 TABLET | Refills: 1 | Status: SHIPPED | OUTPATIENT
Start: 2024-12-10

## 2024-12-10 RX ORDER — CYCLOBENZAPRINE HCL 5 MG
5 TABLET ORAL 3 TIMES DAILY PRN
Qty: 270 TABLET | Refills: 1 | Status: SHIPPED | OUTPATIENT
Start: 2024-12-10 | End: 2025-03-10

## 2024-12-10 RX ORDER — AMLODIPINE BESYLATE 2.5 MG/1
2.5 TABLET ORAL DAILY
Qty: 90 TABLET | Refills: 1 | Status: SHIPPED | OUTPATIENT
Start: 2024-12-10 | End: 2025-03-10

## 2024-12-10 RX ORDER — GABAPENTIN 100 MG/1
200 CAPSULE ORAL 3 TIMES DAILY
Qty: 270 CAPSULE | Refills: 1 | Status: SHIPPED | OUTPATIENT
Start: 2024-12-10 | End: 2025-03-10

## 2024-12-10 RX ORDER — BUPROPION HYDROCHLORIDE 150 MG/1
150 TABLET ORAL EVERY MORNING
Qty: 90 TABLET | Refills: 1 | Status: SHIPPED | OUTPATIENT
Start: 2024-12-10

## 2024-12-10 ASSESSMENT — PROMIS GLOBAL HEALTH SCALE
RATE_AVERAGE_FATIGUE: SEVERE
RATE_GENERAL_HEALTH: POOR
RATE_QUALITY_OF_LIFE: FAIR
CARRYOUT_PHYSICAL_ACTIVITIES: MODERATELY
RATE_MENTAL_HEALTH: FAIR
RATE_SOCIAL_SATISFACTION: FAIR
RATE_PHYSICAL_HEALTH: FAIR
EMOTIONAL_PROBLEMS: OFTEN
CARRYOUT_SOCIAL_ACTIVITIES: FAIR
RATE_AVERAGE_PAIN: 7

## 2024-12-10 ASSESSMENT — PATIENT HEALTH QUESTIONNAIRE - PHQ9
10. IF YOU CHECKED OFF ANY PROBLEMS, HOW DIFFICULT HAVE THESE PROBLEMS MADE IT FOR YOU TO DO YOUR WORK, TAKE CARE OF THINGS AT HOME, OR GET ALONG WITH OTHER PEOPLE: SOMEWHAT DIFFICULT
2. FEELING DOWN, DEPRESSED OR HOPELESS: SEVERAL DAYS
1. LITTLE INTEREST OR PLEASURE IN DOING THINGS: SEVERAL DAYS
SUM OF ALL RESPONSES TO PHQ9 QUESTIONS 1 AND 2: 2
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
## 2024-12-11 ENCOUNTER — TREATMENT (OUTPATIENT)
Dept: PHYSICAL THERAPY | Facility: CLINIC | Age: 49
End: 2024-12-11
Payer: COMMERCIAL

## 2024-12-11 ENCOUNTER — APPOINTMENT (OUTPATIENT)
Dept: INTEGRATIVE MEDICINE | Facility: CLINIC | Age: 49
End: 2024-12-11
Payer: COMMERCIAL

## 2024-12-11 DIAGNOSIS — M25.612 DECREASED RANGE OF MOTION OF LEFT SHOULDER: ICD-10-CM

## 2024-12-11 DIAGNOSIS — M75.02 ADHESIVE CAPSULITIS OF LEFT SHOULDER: Primary | ICD-10-CM

## 2024-12-11 DIAGNOSIS — G89.29 CHRONIC LEFT SHOULDER PAIN: ICD-10-CM

## 2024-12-11 DIAGNOSIS — M25.512 CHRONIC LEFT SHOULDER PAIN: ICD-10-CM

## 2024-12-11 DIAGNOSIS — M25.569 KNEE PAIN: Primary | ICD-10-CM

## 2024-12-11 PROCEDURE — ACUGR GROUP ACUPUNCTURE: Performed by: ACUPUNCTURIST

## 2024-12-11 PROCEDURE — 97140 MANUAL THERAPY 1/> REGIONS: CPT | Mod: GP

## 2024-12-11 PROCEDURE — 97110 THERAPEUTIC EXERCISES: CPT | Mod: GP

## 2024-12-11 ASSESSMENT — PAIN SCALES - GENERAL: PAINLEVEL_OUTOF10: 4

## 2024-12-11 ASSESSMENT — PAIN - FUNCTIONAL ASSESSMENT: PAIN_FUNCTIONAL_ASSESSMENT: 0-10

## 2024-12-11 NOTE — PROGRESS NOTES
Acupuncture Visit:     Subjective   Patient ID: Theodora Montez is a 49 y.o. female who presents for No chief complaint on file.  Seeking support for left sided knee (osteoarthritis) pain and frozen shoulder.          Session Information  Is this acupuncture treatment being billed to the patient's insurance company: No  Visit Type: New patient         Review of Systems         Provider reviewed plan for the acupuncture session, precautions and contraindications. Patient/guardian/hospital staff has given consent to treat with full understanding of what to expect during the session. Before acupuncture began, provider explained to the patient to communicate at any time if the procedure was causing discomfort past their tolerance level. Patient agreed to advise acupuncturist. The acupuncturist counseled the patient on the risks of acupuncture treatment including pain, infection, bleeding, and no relief of pain. The patient was positioned comfortably. There was no evidence of infection at the site of needle insertions.    Objective   Physical Exam         Treatment Plan  Treatment Goals: Pain management    Acupuncture Treatment  Patient Position: Seated and reclining  Acupuncture Needling: Yes  Needle Guage: 40 guage /.16/ Red seirin, 36 guage /.20/ Blue seirin  Body Points - Left: leroy 3, ant deltiod, li 15, sj 14, si 10, li 4, heding xiyan, st 35, lr 8, sp 9, 6, gb 34 st 36, sp 9, gb 21, sj 16, leroy 2  Body Points - Right: lr 3  Needle Count In: 20  Needle Count Out: 20  Needle Retention Time (min): 25 minutes  Total Face to Face Time (min): 25 minutes              Assessment/Plan

## 2024-12-11 NOTE — PROGRESS NOTES
Physical Therapy  Physical Therapy Treatment    Patient Name: Theodora Montez  MRN: [id number]  Today's Date: 12/11/2024  Time Calculation  Start Time: 1730  Stop Time: 1810  Time Calculation (min): 40 min      PT Therapeutic Procedures Time Entry  Manual Therapy Time Entry: 25  Therapeutic Exercise Time Entry: 15          Insurance:  Visit number: 8 of 60  Authorization info: no auth  Insurance Type: Payor: [employer] [facility] / Plan: [employer] / Product Type: *No Product type* /      Current Problem  1. Adhesive capsulitis of left shoulder  Follow Up In Physical Therapy      2. Decreased range of motion of left shoulder  Follow Up In Physical Therapy      3. Chronic left shoulder pain  Follow Up In Physical Therapy              Referred by: Dr. Cheng    General:  General  Reason for Referral: L adhesive capsulitis  Referred By: Dr. Saxena    Precautions:  Precautions  Precautions Comment: hypertension, possible DM, CKD stage 2, fibromyalgia  Medical History Form: Reviewed (scanned into chart)    Subjective:   Patient reports she feels rough today, has been under the weather lately. Shoulder is feeling a little sore today after having acupuncture yesterday. CTJ is the most sensitive but she is experiencing whole body soreness.     Pain Assessment: 0-10  0-10 (Numeric) Pain Score: 4    Objective:   ROM    Cervical AROM (Degrees)    Flexion: 60  Extension: 25  (L) Side Bend: 35  (R) Side Bend: 45  (L) Rotation: 45  (R) Rotation: 55      Shoulder AROM (Degrees) updated 12/11      (R)  (L)  Flexion: 180  130*  Abduction: 180  85-90*    Functional ER: C6  50*   Functional IR: T7  L5     *end range pain    Shoulder PROM (Degrees) updated 12/11      (R)  (L)  Flexion: 180  160*      Abduction: 180  90*     ER at 0:  90  40*     IR at 0:  belly  belly         ER at 90: 90  40*      IR at 90: NT  NT      *end range pain, measurements taken post tx, pre tx all numbers 5-10 degs less    Elbow AROM  (Degrees)      (R)  (L)  Flexion: wfl  wfl      Extension: wfl  wfl            Strength Testing updated 12/4  Shoulder    (R)  (L)  Flexion: 5  3+*      Abduction: 5  3+*     ER:  5  4-*     IR:  5  4*     *pain w/ RROM    Elbow    (R)  (L)  Flexion:          Extension:            Periscapular Musculature    (R)  (L)  Upper Traps:        Middle Traps:        Lower Traps:        Rhomboids:              Posture: mild scapular winging      Palpation: TTP       Joint Mobility: hypomobile GHJ      Observation: ecchymosis resolved          Special Tests    Cervical    Cervical flexion rotation test:  ULTT  Median:  Radial:  Ulnar:  DNF endurance:  Alar ligament:  Sharp Frederick:      RTC/Impingement   Neer Impingement:    Diaz Elkin:    Empty Can:    Drop Arm:    Painful Arc:    Lift Off Test:   AC Joint   Cross Arm Test:   Biceps   Speeds Test:    Yergason Test:   SLAP   O'Brians Test:    Bicep load II:   Instability   Anterior Apprehension:    Posterior Apprehension:    Sulcus Test:   Thoracic Outlet   Adson's Test:    Shamir Test:        Radicular Symptoms:         Outcome Measures:        Treatments:    Manual Therapy: 25'  GHJ inferior glides grade 2-3 in supine  IASTM to posterior cuff in sidelying w/ theragun  STM subscap in supine to tolerance  Prone CTJ manipulation B  Supine upper thoracic manipulation   Sub-occipital release  Subscap STM in supine    There-ex: 15'  UBE 3' fwd, 3' bwd  PROM FF, abduction, ER at 0, 45 and 90 to patient tolerance, as aggressive as possible.    [email or web address]    Access Code: [email or web address]    Vaso-pneumatic Device: 0'        EDUCATION: Home exercise program, plan of care, activity modifications, pain management, and injury pathology       Assessment:   Patient tolerated today's session w/ expected muscle soreness. Today's session was focused on decreasing overall soreness and stretching to improve ROM tolerance. Tolerated session fairly well overall, felt sore at  the end but ROM improved by 5-10 degs for all motions compared to start of session. Continues to have deficits in shoulder ROM, particularly abduction, as well as RTC and scapular strength deficits. Patient will benefit from ongoing PT services to continue to progress ROM, RTC and scapular strengthening activities as tolerated to reach established goals to return to PLOF.          Plan:  Patient is going to inquire w/ referring ortho about possible cortizone injection. Continue PROM and AAROM activities, and manual as tolerated. Emphasis on ER, abduction activities.     Goals: Set and discussed today  Active       Decreased ROM L shoulder       STGs (Progressing)       Start:  08/20/24    Expected End:  12/25/24       1. Demonstrate independence with home exercise program  2. Tolerate increased exercise without adverse reaction  3. Demonstrate improved posture & proper body mechanics throughout session  4. Increase FF and abduction ROM to at least 165 and 120 respectively to progress towards LTGs  5. Increase gross shoulder strength to at least 4-/5 to progress towards LTGs.         LTGs (Not Progressing)       Start:  08/20/24    Expected End:  01/08/25       1. Increase flexion and abduction ROM to pain free + 180 each to improve ADLs  2. Increase gross shoulder strength to pain free + 5/5  3. Decrease score of Quick DASH by > 8 points to meet the MCID  4. Perform ADLs without an increase symptoms  5. Increase shoulder total arc of rotation to at least 160 degs to improve ADLs  6. Increase periscapular strength to at least 4-/5 to improve ADLs                Plan of care was developed with input and agreement by the patient      Rashi Mercado, PT, ATC

## 2024-12-13 ENCOUNTER — DOCUMENTATION (OUTPATIENT)
Dept: PRIMARY CARE | Facility: CLINIC | Age: 49
End: 2024-12-13
Payer: COMMERCIAL

## 2024-12-13 NOTE — PROGRESS NOTES
No MRI IAC is not stat  ===View-only below this line===  ----- Message -----  From: Geraldine Rosa  Sent: 12/13/2024  12:45 PM EST  To: Tha Cheng, DO  Subject: stat?                                            Scheduling called to ask if her MR IAC is supposed to be stat same as the MR Angio head and neck

## 2024-12-16 ENCOUNTER — HOSPITAL ENCOUNTER (OUTPATIENT)
Dept: RADIOLOGY | Facility: CLINIC | Age: 49
Discharge: HOME | End: 2024-12-16
Payer: COMMERCIAL

## 2024-12-16 ENCOUNTER — APPOINTMENT (OUTPATIENT)
Dept: OBSTETRICS AND GYNECOLOGY | Facility: CLINIC | Age: 49
End: 2024-12-16
Payer: COMMERCIAL

## 2024-12-16 DIAGNOSIS — H93.A1 PULSATILE TINNITUS OF RIGHT EAR: ICD-10-CM

## 2024-12-16 PROCEDURE — 70547 MR ANGIOGRAPHY NECK W/O DYE: CPT | Performed by: RADIOLOGY

## 2024-12-16 PROCEDURE — 70544 MR ANGIOGRAPHY HEAD W/O DYE: CPT | Performed by: RADIOLOGY

## 2024-12-16 PROCEDURE — 70544 MR ANGIOGRAPHY HEAD W/O DYE: CPT

## 2024-12-16 PROCEDURE — 70547 MR ANGIOGRAPHY NECK W/O DYE: CPT

## 2024-12-17 DIAGNOSIS — M54.16 LUMBAR RADICULOPATHY: ICD-10-CM

## 2024-12-17 DIAGNOSIS — M43.06 LUMBAR SPONDYLOLYSIS: ICD-10-CM

## 2024-12-17 DIAGNOSIS — M79.7 FIBROMYALGIA: ICD-10-CM

## 2024-12-17 DIAGNOSIS — M43.17 ACQUIRED SPONDYLOLISTHESIS OF LUMBOSACRAL REGION: ICD-10-CM

## 2024-12-17 DIAGNOSIS — G89.4 CHRONIC PAIN SYNDROME: ICD-10-CM

## 2024-12-17 DIAGNOSIS — M17.10 ARTHRITIS OF KNEE: ICD-10-CM

## 2024-12-17 DIAGNOSIS — M54.2 CERVICALGIA: ICD-10-CM

## 2024-12-18 ENCOUNTER — APPOINTMENT (OUTPATIENT)
Dept: PHYSICAL THERAPY | Facility: CLINIC | Age: 49
End: 2024-12-18
Payer: COMMERCIAL

## 2024-12-18 DIAGNOSIS — M25.612 DECREASED RANGE OF MOTION OF LEFT SHOULDER: ICD-10-CM

## 2024-12-18 DIAGNOSIS — M25.512 CHRONIC LEFT SHOULDER PAIN: ICD-10-CM

## 2024-12-18 DIAGNOSIS — G89.29 CHRONIC LEFT SHOULDER PAIN: ICD-10-CM

## 2024-12-18 DIAGNOSIS — M75.02 ADHESIVE CAPSULITIS OF LEFT SHOULDER: Primary | ICD-10-CM

## 2024-12-20 ENCOUNTER — APPOINTMENT (OUTPATIENT)
Dept: INTEGRATIVE MEDICINE | Facility: CLINIC | Age: 49
End: 2024-12-20
Payer: COMMERCIAL

## 2024-12-24 ENCOUNTER — APPOINTMENT (OUTPATIENT)
Dept: BEHAVIORAL HEALTH | Facility: CLINIC | Age: 49
End: 2024-12-24
Payer: COMMERCIAL

## 2025-01-01 SDOH — SOCIAL STABILITY: SOCIAL NETWORK: I HAVE TROUBLE DOING ALL OF THE ACTIVITIES WITH FRIENDS THAT I WANT TO DO: USUALLY

## 2025-01-01 SDOH — SOCIAL STABILITY: SOCIAL NETWORK: PROMIS ABILITY TO PARTICIPATE IN SOCIAL ROLES & ACTIVITIES T-SCORE: 37

## 2025-01-01 SDOH — SOCIAL STABILITY: SOCIAL NETWORK

## 2025-01-01 SDOH — SOCIAL STABILITY: SOCIAL NETWORK: I HAVE TROUBLE DOING ALL OF THE FAMILY ACTIVITIES THAT I WANT TO DO: ALWAYS

## 2025-01-01 SDOH — SOCIAL STABILITY: SOCIAL NETWORK: I HAVE TROUBLE DOING ALL OF MY REGULAR LEISURE ACTIVITIES WITH OTHERS: SOMETIMES

## 2025-01-01 SDOH — SOCIAL STABILITY: SOCIAL NETWORK: I HAVE TROUBLE DOING ALL OF MY USUAL WORK (INCLUDE WORK AT HOME): ALWAYS

## 2025-01-01 ASSESSMENT — PROMIS GLOBAL HEALTH SCALE
EMOTIONAL_PROBLEMS: SOMETIMES
RATE_GENERAL_HEALTH: FAIR
RATE_AVERAGE_FATIGUE: SEVERE
CARRYOUT_SOCIAL_ACTIVITIES: FAIR
RATE_QUALITY_OF_LIFE: GOOD
RATE_MENTAL_HEALTH: GOOD
RATE_AVERAGE_PAIN: 5
RATE_PHYSICAL_HEALTH: FAIR
RATE_SOCIAL_SATISFACTION: FAIR
CARRYOUT_PHYSICAL_ACTIVITIES: MODERATELY

## 2025-01-02 ENCOUNTER — APPOINTMENT (OUTPATIENT)
Dept: NUTRITION | Facility: CLINIC | Age: 50
End: 2025-01-02
Payer: COMMERCIAL

## 2025-01-02 ENCOUNTER — APPOINTMENT (OUTPATIENT)
Dept: INTEGRATIVE MEDICINE | Facility: CLINIC | Age: 50
End: 2025-01-02

## 2025-01-02 SDOH — ECONOMIC STABILITY: FOOD INSECURITY: WITHIN THE PAST 12 MONTHS, YOU WORRIED THAT YOUR FOOD WOULD RUN OUT BEFORE YOU GOT MONEY TO BUY MORE.: NEVER TRUE

## 2025-01-02 SDOH — ECONOMIC STABILITY: FOOD INSECURITY: WITHIN THE PAST 12 MONTHS, THE FOOD YOU BOUGHT JUST DIDN'T LAST AND YOU DIDN'T HAVE MONEY TO GET MORE.: NEVER TRUE

## 2025-01-02 ASSESSMENT — ANXIETY QUESTIONNAIRES
PAST MONTH HOW OFTEN HAVE YOU FELT CONFIDENT ABOUT YOUR ABILITY TO HANDLE YOUR PROBLEMS: 2 - SOMETIMES
PAST MONTH HOW OFTEN HAVE YOU FELT THAT YOU WERE UNABLE TO CONTROL THE IMPORTANT THINGS IN YOUR LIFE: 2 - SOMETIMES
PAST MONTH HOW OFTEN HAVE YOU FELT THAT YOU WERE UNABLE TO CONTROL THE IMPORTANT THINGS IN YOUR LIFE: 2 - SOMETIMES
PAST MONTH HOW OFTEN HAVE YOU FELT CONFIDENT ABOUT YOUR ABILITY TO HANDLE YOUR PROBLEMS: 2 - SOMETIMES
PAST MONTH HOW OFTEN HAVE YOU FELT DIFFICULTIES WERE PILING UP SO HIGH THAT YOU COULD NOT OVERCOME THEM: 3 - FAIRLY OFTEN
PAST MONTH HOW OFTEN HAVE YOU FELT THAT THINGS WERE GOING YOUR WAY: 2 - SOMETIMES

## 2025-01-03 ENCOUNTER — OFFICE VISIT (OUTPATIENT)
Dept: OPHTHALMOLOGY | Facility: CLINIC | Age: 50
End: 2025-01-03
Payer: COMMERCIAL

## 2025-01-03 DIAGNOSIS — H57.89 EYE DISCHARGE: ICD-10-CM

## 2025-01-03 DIAGNOSIS — H16.213 EXPOSURE KERATOCONJUNCTIVITIS OF BOTH EYES: Primary | ICD-10-CM

## 2025-01-03 DIAGNOSIS — H02.59 FLOPPY EYELID SYNDROME: ICD-10-CM

## 2025-01-03 RX ORDER — LOTEPREDNOL ETABONATE 2 MG/ML
1 SUSPENSION/ DROPS OPHTHALMIC 2 TIMES DAILY
Qty: 5 ML | Refills: 0 | Status: SHIPPED | OUTPATIENT
Start: 2025-01-03 | End: 2025-01-31

## 2025-01-03 ASSESSMENT — ENCOUNTER SYMPTOMS
CONSTITUTIONAL NEGATIVE: 0
GASTROINTESTINAL NEGATIVE: 0
PSYCHIATRIC NEGATIVE: 0
HEMATOLOGIC/LYMPHATIC NEGATIVE: 0
ENDOCRINE NEGATIVE: 0
CARDIOVASCULAR NEGATIVE: 0
NEUROLOGICAL NEGATIVE: 0
EYES NEGATIVE: 0
RESPIRATORY NEGATIVE: 0
MUSCULOSKELETAL NEGATIVE: 1
ALLERGIC/IMMUNOLOGIC NEGATIVE: 1

## 2025-01-03 ASSESSMENT — CONF VISUAL FIELD
OD_INFERIOR_TEMPORAL_RESTRICTION: 0
OD_INFERIOR_NASAL_RESTRICTION: 0
OS_INFERIOR_NASAL_RESTRICTION: 0
OS_INFERIOR_TEMPORAL_RESTRICTION: 0
OD_SUPERIOR_NASAL_RESTRICTION: 0
OD_SUPERIOR_TEMPORAL_RESTRICTION: 0
OS_NORMAL: 1
OD_NORMAL: 1
METHOD: COUNTING FINGERS
OS_SUPERIOR_NASAL_RESTRICTION: 0
OS_SUPERIOR_TEMPORAL_RESTRICTION: 0

## 2025-01-03 ASSESSMENT — TONOMETRY
OD_IOP_MMHG: 18
IOP_METHOD: GOLDMANN APPLANATION
OS_IOP_MMHG: 17

## 2025-01-03 ASSESSMENT — EXTERNAL EXAM - RIGHT EYE: OD_EXAM: NORMAL

## 2025-01-03 ASSESSMENT — EXTERNAL EXAM - LEFT EYE: OS_EXAM: NORMAL

## 2025-01-03 ASSESSMENT — VISUAL ACUITY
METHOD: SNELLEN - LINEAR
OD_SC: 20/20
OS_SC: 20/25
OS_SC+: +1

## 2025-01-03 NOTE — PROGRESS NOTES
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  Floppy eyelid syndrome  -new patient presents today with symptoms of eye discharge/crusting AM>PM bilateral for the past 2-3 years  -patient denies eye pain   -previous treatments have included OTC AT's, wilbert, warm compresses, OTC allergy meds, nasal sprays  -currently using cipro gtt TID prescribed by PCP  -currently using a humidifier and air purifier while sleeping  -currently using a sleeping mask  -(+)med hx of sleep apnea-does not use a CPAP machine  -on exam signs of loose upper eyelid adnexa with poor snapback  -signs and symptoms suggestive of floppy eyelid etiology   -TXT plan: wilbert QHS, loteprednol BID 1 month, cont sleep mask, cont humidifier, cont cipro gtt 1 drop QAM    Tear Lab Testing:   OD: 293  OS: 294    Inflammadry:  OD: (+)  OS: (+)      RTC 1 month for f/u to monitor

## 2025-01-09 RX ORDER — GINKGO BILOBA LEAF EXTRACT 60 MG
200 CAPSULE ORAL
COMMUNITY

## 2025-01-09 RX ORDER — GLUCOSAMINE HCL 500 MG
200 TABLET ORAL
COMMUNITY

## 2025-01-14 ENCOUNTER — APPOINTMENT (OUTPATIENT)
Dept: NEPHROLOGY | Facility: CLINIC | Age: 50
End: 2025-01-14
Payer: COMMERCIAL

## 2025-01-14 ENCOUNTER — LAB (OUTPATIENT)
Dept: LAB | Facility: LAB | Age: 50
End: 2025-01-14
Payer: COMMERCIAL

## 2025-01-14 VITALS
DIASTOLIC BLOOD PRESSURE: 89 MMHG | WEIGHT: 213.4 LBS | BODY MASS INDEX: 37.81 KG/M2 | HEIGHT: 63 IN | SYSTOLIC BLOOD PRESSURE: 140 MMHG | TEMPERATURE: 97.5 F | HEART RATE: 80 BPM

## 2025-01-14 DIAGNOSIS — N18.30 STAGE 3 CHRONIC KIDNEY DISEASE, UNSPECIFIED WHETHER STAGE 3A OR 3B CKD (MULTI): ICD-10-CM

## 2025-01-14 LAB
ALBUMIN SERPL BCP-MCNC: 4.4 G/DL (ref 3.4–5)
ANION GAP SERPL CALC-SCNC: 14 MMOL/L (ref 10–20)
APPEARANCE UR: CLEAR
BACTERIA #/AREA URNS AUTO: ABNORMAL /HPF
BASOPHILS # BLD AUTO: 0.05 X10*3/UL (ref 0–0.1)
BASOPHILS NFR BLD AUTO: 0.5 %
BILIRUB UR STRIP.AUTO-MCNC: NEGATIVE MG/DL
BUN SERPL-MCNC: 13 MG/DL (ref 6–23)
CALCIUM SERPL-MCNC: 9.2 MG/DL (ref 8.6–10.6)
CHLORIDE SERPL-SCNC: 107 MMOL/L (ref 98–107)
CO2 SERPL-SCNC: 22 MMOL/L (ref 21–32)
COLOR UR: YELLOW
CREAT SERPL-MCNC: 0.88 MG/DL (ref 0.5–1.05)
CREAT UR-MCNC: 110.1 MG/DL (ref 20–320)
CREAT UR-MCNC: 110.1 MG/DL (ref 20–320)
EGFRCR SERPLBLD CKD-EPI 2021: 81 ML/MIN/1.73M*2
EOSINOPHIL # BLD AUTO: 0.29 X10*3/UL (ref 0–0.7)
EOSINOPHIL NFR BLD AUTO: 3.1 %
ERYTHROCYTE [DISTWIDTH] IN BLOOD BY AUTOMATED COUNT: 12.8 % (ref 11.5–14.5)
GLUCOSE SERPL-MCNC: 70 MG/DL (ref 74–99)
GLUCOSE UR STRIP.AUTO-MCNC: NORMAL MG/DL
HCT VFR BLD AUTO: 42.2 % (ref 36–46)
HGB BLD-MCNC: 14.1 G/DL (ref 12–16)
IMM GRANULOCYTES # BLD AUTO: 0.02 X10*3/UL (ref 0–0.7)
IMM GRANULOCYTES NFR BLD AUTO: 0.2 % (ref 0–0.9)
KAPPA LC SERPL-MCNC: 1.38 MG/DL (ref 0.33–1.94)
KAPPA LC/LAMBDA SER: 1.01 {RATIO} (ref 0.26–1.65)
KETONES UR STRIP.AUTO-MCNC: NEGATIVE MG/DL
LAMBDA LC SERPL-MCNC: 1.36 MG/DL (ref 0.57–2.63)
LEUKOCYTE ESTERASE UR QL STRIP.AUTO: ABNORMAL
LYMPHOCYTES # BLD AUTO: 2.27 X10*3/UL (ref 1.2–4.8)
LYMPHOCYTES NFR BLD AUTO: 24.3 %
MCH RBC QN AUTO: 31.2 PG (ref 26–34)
MCHC RBC AUTO-ENTMCNC: 33.4 G/DL (ref 32–36)
MCV RBC AUTO: 93 FL (ref 80–100)
MICROALBUMIN UR-MCNC: <7 MG/L
MICROALBUMIN/CREAT UR: NORMAL MG/G{CREAT}
MONOCYTES # BLD AUTO: 0.79 X10*3/UL (ref 0.1–1)
MONOCYTES NFR BLD AUTO: 8.5 %
MUCOUS THREADS #/AREA URNS AUTO: ABNORMAL /LPF
NEUTROPHILS # BLD AUTO: 5.91 X10*3/UL (ref 1.2–7.7)
NEUTROPHILS NFR BLD AUTO: 63.4 %
NITRITE UR QL STRIP.AUTO: NEGATIVE
NRBC BLD-RTO: 0 /100 WBCS (ref 0–0)
PH UR STRIP.AUTO: 5.5 [PH]
PHOSPHATE SERPL-MCNC: 3.3 MG/DL (ref 2.5–4.9)
PLATELET # BLD AUTO: 410 X10*3/UL (ref 150–450)
POTASSIUM SERPL-SCNC: 3.9 MMOL/L (ref 3.5–5.3)
PROT SERPL-MCNC: 7.1 G/DL (ref 6.4–8.2)
PROT UR STRIP.AUTO-MCNC: NEGATIVE MG/DL
PROT UR-ACNC: 9 MG/DL (ref 5–24)
PROT/CREAT UR: 0.08 MG/MG CREAT
RBC # BLD AUTO: 4.52 X10*6/UL (ref 4–5.2)
RBC # UR STRIP.AUTO: NEGATIVE /UL
RBC #/AREA URNS AUTO: ABNORMAL /HPF
SODIUM SERPL-SCNC: 139 MMOL/L (ref 136–145)
SP GR UR STRIP.AUTO: 1.02
SQUAMOUS #/AREA URNS AUTO: ABNORMAL /HPF
UROBILINOGEN UR STRIP.AUTO-MCNC: NORMAL MG/DL
WBC # BLD AUTO: 9.3 X10*3/UL (ref 4.4–11.3)
WBC #/AREA URNS AUTO: ABNORMAL /HPF

## 2025-01-14 PROCEDURE — 82043 UR ALBUMIN QUANTITATIVE: CPT

## 2025-01-14 PROCEDURE — 3077F SYST BP >= 140 MM HG: CPT | Performed by: INTERNAL MEDICINE

## 2025-01-14 PROCEDURE — 86334 IMMUNOFIX E-PHORESIS SERUM: CPT

## 2025-01-14 PROCEDURE — 82570 ASSAY OF URINE CREATININE: CPT

## 2025-01-14 PROCEDURE — 86320 SERUM IMMUNOELECTROPHORESIS: CPT | Performed by: INTERNAL MEDICINE

## 2025-01-14 PROCEDURE — 84155 ASSAY OF PROTEIN SERUM: CPT

## 2025-01-14 PROCEDURE — 81001 URINALYSIS AUTO W/SCOPE: CPT

## 2025-01-14 PROCEDURE — 84165 PROTEIN E-PHORESIS SERUM: CPT

## 2025-01-14 PROCEDURE — 99203 OFFICE O/P NEW LOW 30 MIN: CPT | Performed by: INTERNAL MEDICINE

## 2025-01-14 PROCEDURE — 3079F DIAST BP 80-89 MM HG: CPT | Performed by: INTERNAL MEDICINE

## 2025-01-14 PROCEDURE — 85025 COMPLETE CBC W/AUTO DIFF WBC: CPT

## 2025-01-14 PROCEDURE — 84165 PROTEIN E-PHORESIS SERUM: CPT | Performed by: INTERNAL MEDICINE

## 2025-01-14 PROCEDURE — 83521 IG LIGHT CHAINS FREE EACH: CPT

## 2025-01-14 PROCEDURE — 3008F BODY MASS INDEX DOCD: CPT | Performed by: INTERNAL MEDICINE

## 2025-01-14 PROCEDURE — 1036F TOBACCO NON-USER: CPT | Performed by: INTERNAL MEDICINE

## 2025-01-14 PROCEDURE — 80069 RENAL FUNCTION PANEL: CPT

## 2025-01-14 PROCEDURE — 84156 ASSAY OF PROTEIN URINE: CPT

## 2025-01-14 RX ORDER — NEBULIZER AND COMPRESSOR
1 EACH MISCELLANEOUS DAILY
Qty: 1 EACH | Refills: 0 | Status: SHIPPED | OUTPATIENT
Start: 2025-01-14 | End: 2026-01-14

## 2025-01-14 NOTE — PATIENT INSTRUCTIONS
Thank you for your visit!    Today we discussed:   Your kidney function is around 50%  Stage 3a chronic kidney disease    Continue a low salt, DASH diet, no more than 2000 mg (2 g) sodium (salt) daily  Continue regular exercise, at least 20 minutes a day, as you can tolerate  Keep your diabetes well controlled, goal A1C is less than 7.0 per day  Your goal blood pressure is 120s/70-80s; please follow your home blood pressure daily and bring in blood pressure measurements to next office visits=  Avoid kidney toxic medications, such as nonsteroidal anti-inflammatories (NSAIDs); Tylenol or acetaminophen over the counter is okay    Please keep your immunizations up to date  Please discuss with your primary care provider getting the Hepatitis B series of vaccinations    Please follow up with labs one week prior to next visit in 6 months at [facility] [place] [facility] [address]  Jack / Summer ask your insurance about coverage genetic testing    www.kidney.org (National Kid[employer] Foundation website) is a great source of information regarding kidney disease    Sincerely, Dr. Dyer  Phone [phone]    Initiate Treatment: hydrocortisone 2.5 % topical cream - apply thin layer to affected areas on face and neck twice a day for 7-10 days. Samples Given: LaRoche Posay cleanser and Aveeno balm\\nAvoid fragranced personal care products Plan: Will recheck in a few weeks and may switch to a non steroid cream if flaring. Detail Level: Zone
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
## 2025-01-16 ENCOUNTER — APPOINTMENT (OUTPATIENT)
Dept: INTEGRATIVE MEDICINE | Facility: CLINIC | Age: 50
End: 2025-01-16
Payer: COMMERCIAL

## 2025-01-16 LAB
ALBUMIN: 4.2 G/DL (ref 3.4–5)
ALPHA 1 GLOBULIN: 0.4 G/DL (ref 0.2–0.6)
ALPHA 2 GLOBULIN: 0.9 G/DL (ref 0.4–1.1)
BETA GLOBULIN: 0.9 G/DL (ref 0.5–1.2)
GAMMA GLOBULIN: 0.8 G/DL (ref 0.5–1.4)
IMMUNOFIXATION COMMENT: NORMAL
PATH REVIEW - SERUM IMMUNOFIXATION: NORMAL
PATH REVIEW-SERUM PROTEIN ELECTROPHORESIS: NORMAL
PROTEIN ELECTROPHORESIS COMMENT: NORMAL

## 2025-01-17 ENCOUNTER — HOSPITAL ENCOUNTER (OUTPATIENT)
Dept: RADIOLOGY | Facility: HOSPITAL | Age: 50
Discharge: HOME | End: 2025-01-17
Payer: COMMERCIAL

## 2025-01-17 DIAGNOSIS — N18.30 STAGE 3 CHRONIC KIDNEY DISEASE, UNSPECIFIED WHETHER STAGE 3A OR 3B CKD (MULTI): ICD-10-CM

## 2025-01-17 PROCEDURE — 76770 US EXAM ABDO BACK WALL COMP: CPT

## 2025-01-20 ENCOUNTER — APPOINTMENT (OUTPATIENT)
Dept: INTEGRATIVE MEDICINE | Facility: CLINIC | Age: 50
End: 2025-01-20
Payer: COMMERCIAL

## 2025-01-20 ENCOUNTER — OFFICE VISIT (OUTPATIENT)
Dept: PULMONOLOGY | Facility: CLINIC | Age: 50
End: 2025-01-20
Payer: COMMERCIAL

## 2025-01-20 VITALS
RESPIRATION RATE: 18 BRPM | DIASTOLIC BLOOD PRESSURE: 103 MMHG | BODY MASS INDEX: 36.85 KG/M2 | SYSTOLIC BLOOD PRESSURE: 132 MMHG | TEMPERATURE: 97.5 F | HEART RATE: 98 BPM | WEIGHT: 208 LBS | OXYGEN SATURATION: 100 %

## 2025-01-20 DIAGNOSIS — G89.29 CHRONIC LEFT SHOULDER PAIN: ICD-10-CM

## 2025-01-20 DIAGNOSIS — M25.512 CHRONIC LEFT SHOULDER PAIN: ICD-10-CM

## 2025-01-20 DIAGNOSIS — R05.3 CHRONIC COUGH: Primary | ICD-10-CM

## 2025-01-20 DIAGNOSIS — M25.569 KNEE PAIN: Primary | ICD-10-CM

## 2025-01-20 PROCEDURE — 99214 OFFICE O/P EST MOD 30 MIN: CPT | Performed by: INTERNAL MEDICINE

## 2025-01-20 PROCEDURE — 3075F SYST BP GE 130 - 139MM HG: CPT | Performed by: INTERNAL MEDICINE

## 2025-01-20 PROCEDURE — 3080F DIAST BP >= 90 MM HG: CPT | Performed by: INTERNAL MEDICINE

## 2025-01-20 PROCEDURE — 99204 OFFICE O/P NEW MOD 45 MIN: CPT | Performed by: INTERNAL MEDICINE

## 2025-01-20 PROCEDURE — ACUGR GROUP ACUPUNCTURE: Performed by: ACUPUNCTURIST

## 2025-01-20 PROCEDURE — 1036F TOBACCO NON-USER: CPT | Performed by: INTERNAL MEDICINE

## 2025-01-20 RX ORDER — ALBUTEROL SULFATE 0.83 MG/ML
3 SOLUTION RESPIRATORY (INHALATION) ONCE
OUTPATIENT
Start: 2025-01-20 | End: 2025-01-20

## 2025-01-20 RX ORDER — ALBUTEROL SULFATE 90 UG/1
1 INHALANT RESPIRATORY (INHALATION) ONCE
OUTPATIENT
Start: 2025-01-20

## 2025-01-20 ASSESSMENT — ENCOUNTER SYMPTOMS
HEMATURIA: 0
JOINT SWELLING: 0
SLEEP DISTURBANCE: 0
MYALGIAS: 1
ABDOMINAL PAIN: 0
DIZZINESS: 0
FATIGUE: 0
SINUS PAIN: 0
HEADACHES: 0
APNEA: 0
BRUISES/BLEEDS EASILY: 0
LIGHT-HEADEDNESS: 0
NERVOUS/ANXIOUS: 0
DIFFICULTY URINATING: 0
CHOKING: 0
SINUS PRESSURE: 0
NAUSEA: 0
SHORTNESS OF BREATH: 1
ADENOPATHY: 0
COUGH: 1
NUMBNESS: 0
WHEEZING: 0
PALPITATIONS: 0
EYE DISCHARGE: 0
TREMORS: 0
STRIDOR: 0
FEVER: 0
FREQUENCY: 0
EYE REDNESS: 0
ABDOMINAL DISTENTION: 0
AGITATION: 0
UNEXPECTED WEIGHT CHANGE: 0
FACIAL SWELLING: 0
WEAKNESS: 0
ARTHRALGIAS: 1
CONSTIPATION: 0
DYSURIA: 0
RHINORRHEA: 0
SPEECH DIFFICULTY: 0

## 2025-01-20 NOTE — PROGRESS NOTES
Acupuncture Visit:     Subjective   Patient ID: Theodora Montez is a 49 y.o. female who presents for No chief complaint on file.  PT reported that benefits of decreased pain and improved mobility lasted until 2 weeks ago.  Seeking continued support  Previous:  Seeking support for left sided knee (osteoarthritis) pain and frozen shoulder.          Session Information  Is this acupuncture treatment being billed to the patient's insurance company: No  Visit Type: Follow-up visit         Review of Systems         Provider reviewed plan for the acupuncture session, precautions and contraindications. Patient/guardian/hospital staff has given consent to treat with full understanding of what to expect during the session. Before acupuncture began, provider explained to the patient to communicate at any time if the procedure was causing discomfort past their tolerance level. Patient agreed to advise acupuncturist. The acupuncturist counseled the patient on the risks of acupuncture treatment including pain, infection, bleeding, and no relief of pain. The patient was positioned comfortably. There was no evidence of infection at the site of needle insertions.    Objective   Physical Exam         Treatment Plan  Treatment Goals: Pain management    Acupuncture Treatment  Patient Position: Seated and reclining  Needle Guage: 40 guage /.16/ Red seirin, 36 guage /.20/ Blue seirin  Body Points - Left: leroy 3, sj 14, li 15, leroy 2, ant deltoid, gb21, shoulder pt  Body Points - Bilateral: sp 6, 9, st 36, gb 34, lr 8, heding xiyan, st 35, st 40  Needle Count In: 25  Needle Count Out: 25  Needle Retention Time (min): 25 minutes  Total Face to Face Time (min): 25 minutes              Assessment/Plan
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
## 2025-01-20 NOTE — PATIENT INSTRUCTIONS
Will obtain complete pulmonary function test and a fractional exhalation of nitric oxide by calling     In addition when the pulmonary functions are obtained you can also have a chest x-ray taken.  The order has been placed    I have added onto your previous labs from last week quantitative immunoglobulins
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
## 2025-01-21 ENCOUNTER — APPOINTMENT (OUTPATIENT)
Dept: PRIMARY CARE | Facility: CLINIC | Age: 50
End: 2025-01-21
Payer: COMMERCIAL

## 2025-01-23 ENCOUNTER — APPOINTMENT (OUTPATIENT)
Facility: CLINIC | Age: 50
End: 2025-01-23
Payer: COMMERCIAL

## 2025-01-27 ENCOUNTER — APPOINTMENT (OUTPATIENT)
Dept: INTEGRATIVE MEDICINE | Facility: CLINIC | Age: 50
End: 2025-01-27
Payer: COMMERCIAL

## 2025-01-27 DIAGNOSIS — M25.561 CHRONIC PAIN OF BOTH KNEES: ICD-10-CM

## 2025-01-27 DIAGNOSIS — G89.29 CHRONIC LEFT SHOULDER PAIN: Primary | ICD-10-CM

## 2025-01-27 DIAGNOSIS — M25.562 CHRONIC PAIN OF BOTH KNEES: ICD-10-CM

## 2025-01-27 DIAGNOSIS — G89.29 CHRONIC PAIN OF BOTH KNEES: ICD-10-CM

## 2025-01-27 DIAGNOSIS — M25.512 CHRONIC LEFT SHOULDER PAIN: Primary | ICD-10-CM

## 2025-01-27 PROCEDURE — ACUGR GROUP ACUPUNCTURE: Performed by: ACUPUNCTURIST

## 2025-01-27 NOTE — PROGRESS NOTES
Acupuncture Visit:     Subjective   Patient ID: Theodora Montez is a 49 y.o. female who presents for No chief complaint on file.  PT reported knees have been feeling good. Shoulder discomfort and mobility of decreased with cold weather.    Previous:  PT reported that benefits of decreased pain and improved mobility lasted until 2 weeks ago.  Seeking continued support  Previous:  Seeking support for left sided knee (osteoarthritis) pain and frozen shoulder.            Session Information  Is this acupuncture treatment being billed to the patient's insurance company: No  Visit Type: Follow-up visit         Review of Systems         Provider reviewed plan for the acupuncture session, precautions and contraindications. Patient/guardian/hospital staff has given consent to treat with full understanding of what to expect during the session. Before acupuncture began, provider explained to the patient to communicate at any time if the procedure was causing discomfort past their tolerance level. Patient agreed to advise acupuncturist. The acupuncturist counseled the patient on the risks of acupuncture treatment including pain, infection, bleeding, and no relief of pain. The patient was positioned comfortably. There was no evidence of infection at the site of needle insertions.    Objective   Physical Exam         Treatment Plan  Treatment Goals: Pain management    Acupuncture Treatment  Patient Position: Seated and reclining  Acupuncture Needling: Yes  Needle Guage: 36 guage /.20/ Blue seirin  Body Points - Left: trapx2, shoulder pt yin linares  Body Points - Bilateral: heding sp 10, 9, 6 st 35, 36, xiyan lr 8  Needle Count In: 20  Needle Count Out: 20  Needle Retention Time (min): 25 minutes  Total Face to Face Time (min): 25 minutes              Assessment/Plan

## 2025-01-31 ENCOUNTER — OFFICE VISIT (OUTPATIENT)
Dept: GASTROENTEROLOGY | Facility: CLINIC | Age: 50
End: 2025-01-31
Payer: COMMERCIAL

## 2025-01-31 VITALS
SYSTOLIC BLOOD PRESSURE: 136 MMHG | DIASTOLIC BLOOD PRESSURE: 91 MMHG | BODY MASS INDEX: 37.56 KG/M2 | HEIGHT: 63 IN | HEART RATE: 80 BPM | TEMPERATURE: 97.2 F | WEIGHT: 212 LBS

## 2025-01-31 DIAGNOSIS — Z12.11 COLON CANCER SCREENING: Primary | ICD-10-CM

## 2025-01-31 PROCEDURE — 3075F SYST BP GE 130 - 139MM HG: CPT | Performed by: NURSE PRACTITIONER

## 2025-01-31 PROCEDURE — 99204 OFFICE O/P NEW MOD 45 MIN: CPT | Performed by: NURSE PRACTITIONER

## 2025-01-31 PROCEDURE — 99214 OFFICE O/P EST MOD 30 MIN: CPT | Performed by: NURSE PRACTITIONER

## 2025-01-31 PROCEDURE — 3008F BODY MASS INDEX DOCD: CPT | Performed by: NURSE PRACTITIONER

## 2025-01-31 PROCEDURE — 3080F DIAST BP >= 90 MM HG: CPT | Performed by: NURSE PRACTITIONER

## 2025-01-31 RX ORDER — SODIUM, POTASSIUM,MAG SULFATES 17.5-3.13G
SOLUTION, RECONSTITUTED, ORAL ORAL
Qty: 354 ML | Refills: 0 | Status: SHIPPED | OUTPATIENT
Start: 2025-01-31

## 2025-01-31 ASSESSMENT — ENCOUNTER SYMPTOMS
ALLERGIC/IMMUNOLOGIC NEGATIVE: 1
CARDIOVASCULAR NEGATIVE: 1
GASTROINTESTINAL NEGATIVE: 1
EYES NEGATIVE: 1
MUSCULOSKELETAL NEGATIVE: 1
PSYCHIATRIC NEGATIVE: 1
CONSTITUTIONAL NEGATIVE: 1
ENDOCRINE NEGATIVE: 1
NEUROLOGICAL NEGATIVE: 1
RESPIRATORY NEGATIVE: 1
HEMATOLOGIC/LYMPHATIC NEGATIVE: 1

## 2025-01-31 NOTE — PATIENT INSTRUCTIONS
Screening colonoscopy you are due for this and I will order a colonoscopy for you.  Please follow the prep instructions that we reviewed today in clinic and will be provided to you in writing once you are scheduled as well.    You can use benefiber one tablespoon in your protein drink to help regulate your stools.    You will need to skip your dose of Ozempic the week prior to your procedure.    I will see you back for follow up as needed

## 2025-01-31 NOTE — PROGRESS NOTES
Subjective   Patient ID: Theodora Montez is a 49 y.o. female who presents for No chief complaint on file..  HPI  49-year-old female for new patient visit for evaluation for screening colonoscopy  Medical history includes fibromyalgia, OA, hypertension, prediabetes, dyslipidemia disease, depression, lumbar radiculopathy, stage 3 ckd  Family history colon cancer maternal uncles x2 colon cancer age 50's, maternal aunt ovarian cancer  Labs reviewed 1/14/2025 normal CBC  Labs reviewed 12/3/2024  Vitamin D 56  TSH 2.35  BM : looser stools at times for year and then some constipation  Fiber: using protein drinks  Water- daily  Works as teacher  No blood in stool  Weight - working on losing weight- 50 lbs in the last year    Review of Systems   Constitutional: Negative.    HENT: Negative.     Eyes: Negative.    Respiratory: Negative.     Cardiovascular: Negative.    Gastrointestinal: Negative.    Endocrine: Negative.    Genitourinary: Negative.    Musculoskeletal: Negative.    Skin: Negative.    Allergic/Immunologic: Negative.    Neurological: Negative.    Hematological: Negative.    Psychiatric/Behavioral: Negative.         Objective   Physical Exam  Constitutional:       Appearance: Normal appearance.   HENT:      Head: Normocephalic.      Nose: Nose normal.      Mouth/Throat:      Mouth: Mucous membranes are moist.   Eyes:      Pupils: Pupils are equal, round, and reactive to light.   Cardiovascular:      Rate and Rhythm: Normal rate and regular rhythm.      Pulses: Normal pulses.      Heart sounds: Normal heart sounds.   Pulmonary:      Effort: Pulmonary effort is normal.      Breath sounds: Normal breath sounds.   Abdominal:      General: Bowel sounds are normal.      Palpations: Abdomen is soft.   Musculoskeletal:         General: Normal range of motion.      Cervical back: Normal range of motion and neck supple.   Skin:     General: Skin is warm and dry.   Neurological:      Mental Status: She is alert.    Psychiatric:         Mood and Affect: Mood normal.         Assessment/Plan        Screening colonoscopy you are due for this and I will order a colonoscopy for you.  Please follow the prep instructions that we reviewed today in clinic and will be provided to you in writing once you are scheduled as well.    You can use benefiber one tablespoon in your protein drink to help regulate your stools.    You will need to skip your dose of Ozempic the week prior to your procedure.    I will see you back for follow up as needed    I will see you back for follow-up as needed    VINNY Dowling 01/31/25 9:20 AM

## 2025-02-03 NOTE — PROGRESS NOTES
Subjective   Patient ID: Theodora Montez is a 49 y.o. female who presents for No chief complaint on file..    Referring Provider: july ROSS  HPI: CKD stage 2/a1. last EGFR 81 sCr 0.88   HTN:  /91  Medications  Amlodipine 2.5mg every day    Glucose:  A1C 5.0  Medications  Metformin 1000 xr bid  Ozempic  1mg qweek    HLD:  LDL 81  On statin? Atorvastatin 10mg     Medications reviewed for appropriate dosing in setting of CKD  Recommended changes:  -no adjustments needed at this time    Objective     There were no vitals taken for this visit.     Labs  Lab Results   Component Value Date    BILITOT 0.4 08/06/2024    CALCIUM 9.2 01/14/2025    CO2 22 01/14/2025     01/14/2025    CREATININE 0.88 01/14/2025    GLUCOSE 70 (L) 01/14/2025    ALKPHOS 77 08/06/2024    K 3.9 01/14/2025    PROT 7.1 01/14/2025     01/14/2025    AST 18 08/06/2024    ALT 25 08/06/2024    BUN 13 01/14/2025    ANIONGAP 14 01/14/2025    PHOS 3.3 01/14/2025    ALBUMIN 4.4 01/14/2025     Lab Results   Component Value Date    TRIG 116 12/03/2024    CHOL 161 12/03/2024    LDLCALC 81 12/03/2024    HDL 56.6 12/03/2024     Lab Results   Component Value Date    HGBA1C 5.0 12/03/2024       Current Outpatient Medications on File Prior to Visit   Medication Sig Dispense Refill    albuterol 90 mcg/actuation inhaler Inhale 2 puffs every 6 hours if needed for wheezing. 18 g 2    alpha lipoic acid 200 mg tablet Take 1 tablet (200 mg) by mouth once daily.      amLODIPine (Norvasc) 2.5 mg tablet Take 1 tablet (2.5 mg) by mouth once daily. 90 tablet 1    atorvastatin (Lipitor) 10 mg tablet Take 1 tablet (10 mg) by mouth once daily. 90 tablet 1    b complex vitamins capsule TAKE 1 CAPSULE BY MOUTH TWICE A  capsule 4    buPROPion XL (Wellbutrin XL) 150 mg 24 hr tablet Take 1 tablet (150 mg) by mouth once daily in the morning. 90 tablet 1    buPROPion XL (Wellbutrin XL) 300 mg 24 hr tablet Take 1 tablet (300 mg) by mouth once daily. Do  not crush, chew, or split. 90 tablet 1    cholecalciferol, vitamin D3, (VITAMIN D3 ORAL) Take by mouth.      COQ10, UBIQUINOL, ORAL Take by mouth.      cyclobenzaprine (Flexeril) 5 mg tablet Take 1 tablet (5 mg) by mouth 3 times a day as needed for muscle spasms. 270 tablet 1    gabapentin (Neurontin) 100 mg capsule Take 2 capsules (200 mg) by mouth 3 times a day. 270 capsule 1    ginseng 100 mg capsule Take 200 mg by mouth once daily.      [] loteprednol (Lotemax) 0.2 % drops,suspension ophthalmic suspension Administer 1 drop into both eyes 2 times a day for 28 days. 5 mL 0    metFORMIN (Glucophage) 1,000 mg tablet Take 1 tablet (1,000 mg) by mouth 2 times daily (morning and late afternoon). 60 tablet 5    milnacipran (SavElla) 50 MG tablet Take 2 tablets (100 mg) by mouth 2 times a day. 180 tablet 4    miscellaneous medical supply (Blood Pressure Cuff) misc 1 Units once daily. Prefer automatic Omeron Arm Cuff; Please provide patient with education on how to use the cuff; Check blood pressure daily and record for the week before clinic visits 1 each 0    semaglutide (OZEMPIC) 1 mg/dose (4 mg/3 mL) pen injector Inject 1 mg under the skin 1 (one) time per week. 9 mL 1    sodium,potassium,mag sulfates (Suprep Bowel Prep Kit) 17.5-3.13-1.6 gram solution Take one bottle twice as directed by the prep instructions 354 mL 0    topiramate (Topamax) 50 mg tablet Take 2 tablets (100 mg) by mouth once daily. 180 tablet 1     No current facility-administered medications on file prior to visit.        Assessment/Plan   PATIENT EDUCATION/DISCUSSION:  Farxiga Education:    - Counseled patient on Farxiga MOA, expectations, side effects, duration of therapy, administration, and monitoring parameters.  - Reviewed the benefits of SGLT-2i therapy, such as glycemic control and kidney and CV protection.  - Advised patient to practice proper  hygiene to reduce risk of UTIs or yeast infections.  - Advised patient to maintain  adequate fluid intake to remain hydrated while on SGLT2i therapy.  - Answered all patient questions and concerns.  - farxiga $25 enrolled in copay card  - patient reports significant gi issues with metformin. Resent as er product.    _______________________________________________________________________  PLAN  1. START farxiga 10mg every day  2. Switch metformin to er product  3.. Prescription sent to [facility] pharmacy for assistance on authorization and copay. Medication will be mailed to patient.  3/6 2p  Ric Christina, PharmD    Continue all meds under the continuation of care with the referring provider and clinical pharmacy team.

## 2025-02-04 ENCOUNTER — APPOINTMENT (OUTPATIENT)
Dept: PHARMACY | Facility: HOSPITAL | Age: 50
End: 2025-02-04
Payer: COMMERCIAL

## 2025-02-04 DIAGNOSIS — N18.30 STAGE 3 CHRONIC KIDNEY DISEASE, UNSPECIFIED WHETHER STAGE 3A OR 3B CKD (MULTI): ICD-10-CM

## 2025-02-04 DIAGNOSIS — N94.6 DYSMENORRHEA: ICD-10-CM

## 2025-02-04 RX ORDER — DAPAGLIFLOZIN 10 MG/1
10 TABLET, FILM COATED ORAL DAILY
Qty: 30 TABLET | Refills: 11 | Status: SHIPPED | OUTPATIENT
Start: 2025-02-04 | End: 2026-01-30

## 2025-02-04 RX ORDER — METFORMIN HYDROCHLORIDE 500 MG/1
1000 TABLET, EXTENDED RELEASE ORAL
Qty: 360 TABLET | Refills: 3 | Status: SHIPPED | OUTPATIENT
Start: 2025-02-04 | End: 2026-03-11

## 2025-02-06 PROCEDURE — RXMED WILLOW AMBULATORY MEDICATION CHARGE

## 2025-02-07 ENCOUNTER — PHARMACY VISIT (OUTPATIENT)
Dept: PHARMACY | Facility: CLINIC | Age: 50
End: 2025-02-07
Payer: COMMERCIAL

## 2025-02-10 ENCOUNTER — APPOINTMENT (OUTPATIENT)
Dept: INTEGRATIVE MEDICINE | Facility: CLINIC | Age: 50
End: 2025-02-10
Payer: COMMERCIAL

## 2025-02-10 DIAGNOSIS — M25.512 CHRONIC LEFT SHOULDER PAIN: ICD-10-CM

## 2025-02-10 DIAGNOSIS — G89.29 CHRONIC PAIN OF BOTH KNEES: Primary | ICD-10-CM

## 2025-02-10 DIAGNOSIS — M25.561 CHRONIC PAIN OF BOTH KNEES: Primary | ICD-10-CM

## 2025-02-10 DIAGNOSIS — G89.29 CHRONIC LEFT SHOULDER PAIN: ICD-10-CM

## 2025-02-10 DIAGNOSIS — M25.562 CHRONIC PAIN OF BOTH KNEES: Primary | ICD-10-CM

## 2025-02-10 PROCEDURE — ACUGR GROUP ACUPUNCTURE: Performed by: ACUPUNCTURIST

## 2025-02-10 NOTE — PROGRESS NOTES
Acupuncture Visit:     Subjective   Patient ID: Theodora Montez is a 49 y.o. female who presents for No chief complaint on file.  Seeking continued support for bilateral knee pain and shoulder pain which worsen with cold weather.        Session Information  Is this acupuncture treatment being billed to the patient's insurance company: No  Visit Type: Follow-up visit         Review of Systems         Provider reviewed plan for the acupuncture session, precautions and contraindications. Patient/guardian/hospital staff has given consent to treat with full understanding of what to expect during the session. Before acupuncture began, provider explained to the patient to communicate at any time if the procedure was causing discomfort past their tolerance level. Patient agreed to advise acupuncturist. The acupuncturist counseled the patient on the risks of acupuncture treatment including pain, infection, bleeding, and no relief of pain. The patient was positioned comfortably. There was no evidence of infection at the site of needle insertions.    Objective   Physical Exam         Treatment Plan  Treatment Goals: Pain management    Acupuncture Treatment  Patient Position: Seated and reclining  Needle Guage: 36 guage /.20/ Blue seirin, 40 guage /.16/ Red seirin  Body Points - Left: gb 21, shoulder pt li 15, sj 14du 20  Body Points - Bilateral: sp 6, 9 , st 36, lr 8, gb 34, heding xiyan st 35  Needle Count In: 21  Needle Count Out: 21  Needle Retention Time (min): 25 minutes  Total Face to Face Time (min): 25 minutes              Assessment/Plan

## 2025-02-14 DIAGNOSIS — Z12.11 COLON CANCER SCREENING: ICD-10-CM

## 2025-02-14 RX ORDER — SODIUM, POTASSIUM,MAG SULFATES 17.5-3.13G
SOLUTION, RECONSTITUTED, ORAL ORAL
Qty: 354 ML | Refills: 0 | Status: SHIPPED | OUTPATIENT
Start: 2025-02-14

## 2025-02-17 ENCOUNTER — APPOINTMENT (OUTPATIENT)
Dept: INTEGRATIVE MEDICINE | Facility: CLINIC | Age: 50
End: 2025-02-17
Payer: COMMERCIAL

## 2025-02-17 ENCOUNTER — TELEPHONE (OUTPATIENT)
Dept: OBSTETRICS AND GYNECOLOGY | Facility: CLINIC | Age: 50
End: 2025-02-17

## 2025-02-17 ENCOUNTER — APPOINTMENT (OUTPATIENT)
Dept: OBSTETRICS AND GYNECOLOGY | Facility: CLINIC | Age: 50
End: 2025-02-17
Payer: COMMERCIAL

## 2025-02-17 ENCOUNTER — APPOINTMENT (OUTPATIENT)
Dept: OPHTHALMOLOGY | Facility: CLINIC | Age: 50
End: 2025-02-17
Payer: COMMERCIAL

## 2025-02-17 VITALS
WEIGHT: 209 LBS | SYSTOLIC BLOOD PRESSURE: 122 MMHG | HEIGHT: 63 IN | BODY MASS INDEX: 37.03 KG/M2 | DIASTOLIC BLOOD PRESSURE: 84 MMHG

## 2025-02-17 DIAGNOSIS — M25.562 LEFT KNEE PAIN, UNSPECIFIED CHRONICITY: ICD-10-CM

## 2025-02-17 DIAGNOSIS — Z01.419 ENCOUNTER FOR ANNUAL ROUTINE GYNECOLOGICAL EXAMINATION: Primary | ICD-10-CM

## 2025-02-17 DIAGNOSIS — M25.512 CHRONIC LEFT SHOULDER PAIN: Primary | ICD-10-CM

## 2025-02-17 DIAGNOSIS — N95.1 PERIMENOPAUSE: ICD-10-CM

## 2025-02-17 DIAGNOSIS — G89.29 CHRONIC LEFT SHOULDER PAIN: Primary | ICD-10-CM

## 2025-02-17 PROCEDURE — 3074F SYST BP LT 130 MM HG: CPT | Performed by: OBSTETRICS & GYNECOLOGY

## 2025-02-17 PROCEDURE — 99396 PREV VISIT EST AGE 40-64: CPT | Performed by: OBSTETRICS & GYNECOLOGY

## 2025-02-17 PROCEDURE — ACUGR GROUP ACUPUNCTURE: Performed by: ACUPUNCTURIST

## 2025-02-17 PROCEDURE — 1036F TOBACCO NON-USER: CPT | Performed by: OBSTETRICS & GYNECOLOGY

## 2025-02-17 PROCEDURE — 3008F BODY MASS INDEX DOCD: CPT | Performed by: OBSTETRICS & GYNECOLOGY

## 2025-02-17 PROCEDURE — 3079F DIAST BP 80-89 MM HG: CPT | Performed by: OBSTETRICS & GYNECOLOGY

## 2025-02-17 ASSESSMENT — PAIN SCALES - GENERAL: PAINLEVEL_OUTOF10: 0-NO PAIN

## 2025-02-17 NOTE — PROGRESS NOTES
Acupuncture Visit:     Subjective   Patient ID: Theodora Montez is a 49 y.o. female who presents for No chief complaint on file.  PT seeking continued support for left shoulder pain and knee pain. She shared that post treatment she experiences dull ache soreness in knees and shoulder immediately after tx which has decreased but is still present.  Gains with improved ROM maintained.          Session Information  Is this acupuncture treatment being billed to the patient's insurance company: No  Visit Type: Follow-up visit         Review of Systems         Provider reviewed plan for the acupuncture session, precautions and contraindications. Patient/guardian/hospital staff has given consent to treat with full understanding of what to expect during the session. Before acupuncture began, provider explained to the patient to communicate at any time if the procedure was causing discomfort past their tolerance level. Patient agreed to advise acupuncturist. The acupuncturist counseled the patient on the risks of acupuncture treatment including pain, infection, bleeding, and no relief of pain. The patient was positioned comfortably. There was no evidence of infection at the site of needle insertions.    Objective   Physical Exam         Treatment Plan  Treatment Goals: Pain management    Acupuncture Treatment  Patient Position: Seated and reclining  Acupuncture Needling: Yes  Needle Guage: 36 guage /.20/ Blue seirin, 40 guage /.16/ Red seirin  Body Points - Left: gb 21, shoulder pt leroy 3, pec minor,  li 4, gb 34, sp 6, 9, 10, st 36, heding, li 15, lr 8  Body Points - Right: lr 3  Needle Count In: 14  Needle Count Out: 14  Needle Retention Time (min): 25 minutes  Total Face to Face Time (min): 25 minutes              Assessment/Plan

## 2025-02-17 NOTE — PROGRESS NOTES
"Assessment     PLAN  1. Encounter for annual routine gynecological examination (Primary)  - pap up to date  - mammogram up to date, scheduled for 2025  - scheduled for colonoscopy    2. Perimenopause  - reviewed what to expect during the menopausal transition    Please return for your next visit in 1 year or sooner as needed.    Cathy Lawson MD      Subjective     Theodora Montez is a 49 y.o. female who is here for a routine exam.   PCP = DO ANTWAN Wood Concerns:   - Denies no hot flashes    Gynecologic History:    OBHx: G0  Menses: about one time per month, 3 days duration,   Last Pap: 1/2024 NILM/neg HPV  History of Dysplasia: Denies  Sexually active: rarely active with   STI testing: Declines  Contraception: None  Colon cancer screening: scheduled  3/2025  Mammogram: BIRADS 2 in 6/2024, scheduled for next month  FamHx of GYN cancers:  maternal aunt had ovarian cancer (60s)      PMH, PSH, FH, SH, medications and allergies reviewed and edited as needed.      Objective   /84   Ht 1.6 m (5' 3\")   Wt 94.8 kg (209 lb)   LMP 02/17/2025 (Exact Date)   BMI 37.02 kg/m²      General:   Alert and oriented, in no acute distress   Neck: Supple. No visible thyromegaly.    Breast/Axilla: Normal to palpation bilaterally without masses, skin changes, or nipple discharge.    Abdomen: Soft, non-tender, without masses or organomegaly   Vulva: Normal architecture without erythema, masses, or lesions.    Vagina: Normal mucosa without lesions, masses, or atrophy. No abnormal vaginal discharge.    Cervix: Normal without masses, lesions, or signs of cervicitis.    Uterus: Normal mobile, non-enlarged uterus    Adnexa: Normal without masses or lesions   Pelvic Floor No POP noted. No high tone pelvic floor   Psych Normal affect. Normal mood.        "

## 2025-02-18 ENCOUNTER — HOSPITAL ENCOUNTER (OUTPATIENT)
Dept: RESPIRATORY THERAPY | Facility: HOSPITAL | Age: 50
Discharge: HOME | End: 2025-02-18
Payer: COMMERCIAL

## 2025-02-18 DIAGNOSIS — R05.3 CHRONIC COUGH: ICD-10-CM

## 2025-02-18 PROCEDURE — 94729 DIFFUSING CAPACITY: CPT | Performed by: INTERNAL MEDICINE

## 2025-02-18 PROCEDURE — 94060 EVALUATION OF WHEEZING: CPT | Performed by: INTERNAL MEDICINE

## 2025-02-18 PROCEDURE — 94726 PLETHYSMOGRAPHY LUNG VOLUMES: CPT | Performed by: INTERNAL MEDICINE

## 2025-02-18 PROCEDURE — 94726 PLETHYSMOGRAPHY LUNG VOLUMES: CPT

## 2025-02-20 ENCOUNTER — APPOINTMENT (OUTPATIENT)
Dept: PRIMARY CARE | Facility: CLINIC | Age: 50
End: 2025-02-20
Payer: COMMERCIAL

## 2025-02-20 ENCOUNTER — TELEPHONE (OUTPATIENT)
Dept: PULMONOLOGY | Facility: CLINIC | Age: 50
End: 2025-02-20

## 2025-02-20 ENCOUNTER — OFFICE VISIT (OUTPATIENT)
Dept: PRIMARY CARE | Facility: CLINIC | Age: 50
End: 2025-02-20
Payer: COMMERCIAL

## 2025-02-20 ENCOUNTER — DOCUMENTATION (OUTPATIENT)
Dept: PULMONOLOGY | Facility: HOSPITAL | Age: 50
End: 2025-02-20

## 2025-02-20 VITALS — BODY MASS INDEX: 37.2 KG/M2 | WEIGHT: 210 LBS | SYSTOLIC BLOOD PRESSURE: 128 MMHG | DIASTOLIC BLOOD PRESSURE: 84 MMHG

## 2025-02-20 DIAGNOSIS — E66.9 OBESITY, UNSPECIFIED CLASS, UNSPECIFIED OBESITY TYPE, UNSPECIFIED WHETHER SERIOUS COMORBIDITY PRESENT: ICD-10-CM

## 2025-02-20 DIAGNOSIS — M62.838 MUSCLE SPASM: ICD-10-CM

## 2025-02-20 DIAGNOSIS — R40.0 DAYTIME SOMNOLENCE: ICD-10-CM

## 2025-02-20 DIAGNOSIS — R05.3 CHRONIC COUGH: Primary | ICD-10-CM

## 2025-02-20 DIAGNOSIS — G47.00 INSOMNIA, UNSPECIFIED TYPE: Primary | ICD-10-CM

## 2025-02-20 DIAGNOSIS — I10 PRIMARY HYPERTENSION: ICD-10-CM

## 2025-02-20 PROCEDURE — 99215 OFFICE O/P EST HI 40 MIN: CPT | Performed by: INTERNAL MEDICINE

## 2025-02-20 PROCEDURE — 1036F TOBACCO NON-USER: CPT | Performed by: INTERNAL MEDICINE

## 2025-02-20 PROCEDURE — 3079F DIAST BP 80-89 MM HG: CPT | Performed by: INTERNAL MEDICINE

## 2025-02-20 PROCEDURE — 3074F SYST BP LT 130 MM HG: CPT | Performed by: INTERNAL MEDICINE

## 2025-02-20 RX ORDER — AMITRIPTYLINE HYDROCHLORIDE 10 MG/1
10 TABLET, FILM COATED ORAL NIGHTLY
Qty: 90 TABLET | Refills: 1 | Status: SHIPPED | OUTPATIENT
Start: 2025-02-20 | End: 2025-05-21

## 2025-02-20 RX ORDER — CYCLOBENZAPRINE HCL 5 MG
5 TABLET ORAL 3 TIMES DAILY PRN
Qty: 270 TABLET | Refills: 1 | Status: SHIPPED | OUTPATIENT
Start: 2025-02-20 | End: 2025-05-21

## 2025-02-20 ASSESSMENT — PATIENT HEALTH QUESTIONNAIRE - PHQ9
2. FEELING DOWN, DEPRESSED OR HOPELESS: NOT AT ALL
SUM OF ALL RESPONSES TO PHQ9 QUESTIONS 1 AND 2: 0
1. LITTLE INTEREST OR PLEASURE IN DOING THINGS: NOT AT ALL
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
## 2025-02-20 NOTE — PROGRESS NOTES
Pulmonary function test and FeNO are all normal.  Will check her respiratory allergy panel along with immunoglobulin levels

## 2025-02-21 ENCOUNTER — APPOINTMENT (OUTPATIENT)
Dept: OPHTHALMOLOGY | Facility: CLINIC | Age: 50
End: 2025-02-21
Payer: COMMERCIAL

## 2025-02-21 DIAGNOSIS — H02.59 FLOPPY EYELID SYNDROME: ICD-10-CM

## 2025-02-21 DIAGNOSIS — H16.213 EXPOSURE KERATOCONJUNCTIVITIS OF BOTH EYES: Primary | ICD-10-CM

## 2025-02-21 PROCEDURE — 99213 OFFICE O/P EST LOW 20 MIN: CPT | Performed by: STUDENT IN AN ORGANIZED HEALTH CARE EDUCATION/TRAINING PROGRAM

## 2025-02-21 ASSESSMENT — ENCOUNTER SYMPTOMS
MUSCULOSKELETAL NEGATIVE: 0
RESPIRATORY NEGATIVE: 0
ENDOCRINE NEGATIVE: 0
EYES NEGATIVE: 0
GASTROINTESTINAL NEGATIVE: 0
NEUROLOGICAL NEGATIVE: 0
CONSTITUTIONAL NEGATIVE: 0
ALLERGIC/IMMUNOLOGIC NEGATIVE: 0
HEMATOLOGIC/LYMPHATIC NEGATIVE: 0
PSYCHIATRIC NEGATIVE: 0
CARDIOVASCULAR NEGATIVE: 0

## 2025-02-21 ASSESSMENT — VISUAL ACUITY
METHOD: SNELLEN - LINEAR
OD_SC: 20/20
OS_SC: 20/20
OS_SC+: -2

## 2025-02-21 ASSESSMENT — TONOMETRY
IOP_METHOD: TONOPEN
OS_IOP_MMHG: 15
OD_IOP_MMHG: 13

## 2025-02-21 ASSESSMENT — EXTERNAL EXAM - LEFT EYE: OS_EXAM: NORMAL

## 2025-02-21 ASSESSMENT — EXTERNAL EXAM - RIGHT EYE: OD_EXAM: NORMAL

## 2025-02-21 NOTE — PROGRESS NOTES
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  Floppy eyelid syndrome  -patient here for 1 month f/u doing much better  -reports her discharge, crusting, irritation has improved over the past month with our treatments  -hx of symptoms of eye discharge/crusting AM>PM bilateral for the past 2-3 years  -patient denies eye pain   -previous treatments have included OTC AT's, wilbert, warm compresses, OTC allergy meds, nasal sprays  -currently using a humidifier and air purifier while sleeping  -currently using a sleeping mask  -(+)med hx of sleep apnea-does not use a CPAP machine  -on exam signs of loose upper eyelid adnexa with poor snapback  -signs and symptoms suggestive of floppy eyelid etiology   -currently only using lubrication/wilbert prn at night  -reports starting to use Zyrtec as recommended by another provider which has seemed to help her ocular symptoms a lot  -on exam no inflammatory signs but rapid TBUT  -will focus our treatment moving forward on baseline lubrication  -TXT plan: wilbert QHS, cont sleep mask, cont humidifier, warm compresses, and AT's prn    RTC 10/2025 for annual with DFE and MRX

## 2025-03-03 ENCOUNTER — APPOINTMENT (OUTPATIENT)
Dept: INTEGRATIVE MEDICINE | Facility: CLINIC | Age: 50
End: 2025-03-03
Payer: COMMERCIAL

## 2025-03-03 ENCOUNTER — APPOINTMENT (OUTPATIENT)
Dept: RADIOLOGY | Facility: CLINIC | Age: 50
End: 2025-03-03
Payer: COMMERCIAL

## 2025-03-04 ENCOUNTER — APPOINTMENT (OUTPATIENT)
Dept: RADIOLOGY | Facility: CLINIC | Age: 50
End: 2025-03-04
Payer: COMMERCIAL

## 2025-03-06 ENCOUNTER — APPOINTMENT (OUTPATIENT)
Dept: PHARMACY | Facility: HOSPITAL | Age: 50
End: 2025-03-06
Payer: COMMERCIAL

## 2025-03-10 PROCEDURE — RXMED WILLOW AMBULATORY MEDICATION CHARGE

## 2025-03-11 ENCOUNTER — PHARMACY VISIT (OUTPATIENT)
Dept: PHARMACY | Facility: CLINIC | Age: 50
End: 2025-03-11
Payer: COMMERCIAL

## 2025-03-11 PROCEDURE — RXMED WILLOW AMBULATORY MEDICATION CHARGE

## 2025-03-12 ENCOUNTER — PHARMACY VISIT (OUTPATIENT)
Dept: PHARMACY | Facility: CLINIC | Age: 50
End: 2025-03-12
Payer: COMMERCIAL

## 2025-03-20 ENCOUNTER — APPOINTMENT (OUTPATIENT)
Dept: RADIOLOGY | Facility: HOSPITAL | Age: 50
End: 2025-03-20
Payer: COMMERCIAL

## 2025-03-20 ENCOUNTER — HOSPITAL ENCOUNTER (EMERGENCY)
Facility: HOSPITAL | Age: 50
Discharge: HOME | End: 2025-03-21
Attending: EMERGENCY MEDICINE
Payer: COMMERCIAL

## 2025-03-20 ENCOUNTER — OFFICE VISIT (OUTPATIENT)
Dept: URGENT CARE | Age: 50
End: 2025-03-20
Payer: COMMERCIAL

## 2025-03-20 VITALS
HEART RATE: 85 BPM | RESPIRATION RATE: 18 BRPM | TEMPERATURE: 97.9 F | DIASTOLIC BLOOD PRESSURE: 91 MMHG | OXYGEN SATURATION: 98 % | SYSTOLIC BLOOD PRESSURE: 152 MMHG

## 2025-03-20 DIAGNOSIS — N93.8 DUB (DYSFUNCTIONAL UTERINE BLEEDING): Primary | ICD-10-CM

## 2025-03-20 DIAGNOSIS — N93.8 DYSFUNCTIONAL UTERINE BLEEDING: Primary | ICD-10-CM

## 2025-03-20 DIAGNOSIS — K44.9 HIATAL HERNIA: ICD-10-CM

## 2025-03-20 LAB
ALBUMIN SERPL BCP-MCNC: 4.4 G/DL (ref 3.4–5)
ALP SERPL-CCNC: 80 U/L (ref 33–110)
ALT SERPL W P-5'-P-CCNC: 53 U/L (ref 7–45)
ANION GAP SERPL CALC-SCNC: 11 MMOL/L (ref 10–20)
AST SERPL W P-5'-P-CCNC: 23 U/L (ref 9–39)
B-HCG SERPL-ACNC: <2 MIU/ML
BASOPHILS # BLD AUTO: 0.06 X10*3/UL (ref 0–0.1)
BASOPHILS NFR BLD AUTO: 0.6 %
BILIRUB SERPL-MCNC: 0.5 MG/DL (ref 0–1.2)
BUN SERPL-MCNC: 16 MG/DL (ref 6–23)
CALCIUM SERPL-MCNC: 9.4 MG/DL (ref 8.6–10.3)
CHLORIDE SERPL-SCNC: 106 MMOL/L (ref 98–107)
CO2 SERPL-SCNC: 25 MMOL/L (ref 21–32)
CREAT SERPL-MCNC: 0.98 MG/DL (ref 0.5–1.05)
EGFRCR SERPLBLD CKD-EPI 2021: 71 ML/MIN/1.73M*2
EOSINOPHIL # BLD AUTO: 0.32 X10*3/UL (ref 0–0.7)
EOSINOPHIL NFR BLD AUTO: 3 %
ERYTHROCYTE [DISTWIDTH] IN BLOOD BY AUTOMATED COUNT: 11.9 % (ref 11.5–14.5)
GLUCOSE SERPL-MCNC: 75 MG/DL (ref 74–99)
HCT VFR BLD AUTO: 43.1 % (ref 36–46)
HGB BLD-MCNC: 14.1 G/DL (ref 12–16)
IMM GRANULOCYTES # BLD AUTO: 0.03 X10*3/UL (ref 0–0.7)
IMM GRANULOCYTES NFR BLD AUTO: 0.3 % (ref 0–0.9)
LYMPHOCYTES # BLD AUTO: 3.37 X10*3/UL (ref 1.2–4.8)
LYMPHOCYTES NFR BLD AUTO: 31.6 %
MCH RBC QN AUTO: 30.5 PG (ref 26–34)
MCHC RBC AUTO-ENTMCNC: 32.7 G/DL (ref 32–36)
MCV RBC AUTO: 93 FL (ref 80–100)
MONOCYTES # BLD AUTO: 0.85 X10*3/UL (ref 0.1–1)
MONOCYTES NFR BLD AUTO: 8 %
NEUTROPHILS # BLD AUTO: 6.05 X10*3/UL (ref 1.2–7.7)
NEUTROPHILS NFR BLD AUTO: 56.5 %
NRBC BLD-RTO: 0 /100 WBCS (ref 0–0)
PLATELET # BLD AUTO: 347 X10*3/UL (ref 150–450)
POTASSIUM SERPL-SCNC: 3.4 MMOL/L (ref 3.5–5.3)
PROT SERPL-MCNC: 7.3 G/DL (ref 6.4–8.2)
RBC # BLD AUTO: 4.62 X10*6/UL (ref 4–5.2)
SODIUM SERPL-SCNC: 139 MMOL/L (ref 136–145)
TSH SERPL-ACNC: 3 MIU/L (ref 0.44–3.98)
WBC # BLD AUTO: 10.7 X10*3/UL (ref 4.4–11.3)

## 2025-03-20 PROCEDURE — 80053 COMPREHEN METABOLIC PANEL: CPT | Performed by: PHYSICIAN ASSISTANT

## 2025-03-20 PROCEDURE — 85025 COMPLETE CBC W/AUTO DIFF WBC: CPT | Performed by: PHYSICIAN ASSISTANT

## 2025-03-20 PROCEDURE — 76856 US EXAM PELVIC COMPLETE: CPT | Performed by: RADIOLOGY

## 2025-03-20 PROCEDURE — 36415 COLL VENOUS BLD VENIPUNCTURE: CPT | Performed by: PHYSICIAN ASSISTANT

## 2025-03-20 PROCEDURE — 74177 CT ABD & PELVIS W/CONTRAST: CPT

## 2025-03-20 PROCEDURE — 84702 CHORIONIC GONADOTROPIN TEST: CPT | Performed by: PHYSICIAN ASSISTANT

## 2025-03-20 PROCEDURE — 99285 EMERGENCY DEPT VISIT HI MDM: CPT | Mod: 25 | Performed by: EMERGENCY MEDICINE

## 2025-03-20 PROCEDURE — 76830 TRANSVAGINAL US NON-OB: CPT | Performed by: RADIOLOGY

## 2025-03-20 PROCEDURE — 76830 TRANSVAGINAL US NON-OB: CPT

## 2025-03-20 PROCEDURE — 2550000001 HC RX 255 CONTRASTS: Performed by: EMERGENCY MEDICINE

## 2025-03-20 PROCEDURE — 84443 ASSAY THYROID STIM HORMONE: CPT | Performed by: PHYSICIAN ASSISTANT

## 2025-03-20 RX ORDER — TRANEXAMIC ACID 650 MG/1
1300 TABLET ORAL 3 TIMES DAILY
Qty: 30 TABLET | Refills: 0 | Status: SHIPPED | OUTPATIENT
Start: 2025-03-20 | End: 2025-03-25

## 2025-03-20 RX ADMIN — IOHEXOL 75 ML: 350 INJECTION, SOLUTION INTRAVENOUS at 23:15

## 2025-03-20 ASSESSMENT — COLUMBIA-SUICIDE SEVERITY RATING SCALE - C-SSRS
2. HAVE YOU ACTUALLY HAD ANY THOUGHTS OF KILLING YOURSELF?: NO
6. HAVE YOU EVER DONE ANYTHING, STARTED TO DO ANYTHING, OR PREPARED TO DO ANYTHING TO END YOUR LIFE?: NO
1. IN THE PAST MONTH, HAVE YOU WISHED YOU WERE DEAD OR WISHED YOU COULD GO TO SLEEP AND NOT WAKE UP?: NO

## 2025-03-20 ASSESSMENT — ENCOUNTER SYMPTOMS
CHILLS: 0
ABDOMINAL PAIN: 0
VOMITING: 0
COLOR CHANGE: 0
BACK PAIN: 1
FEVER: 0
DYSURIA: 0
PALPITATIONS: 0
COUGH: 0
ARTHRALGIAS: 0
SHORTNESS OF BREATH: 0
SEIZURES: 0
HEMATURIA: 0
EYE PAIN: 0
SORE THROAT: 0

## 2025-03-20 ASSESSMENT — PAIN SCALES - GENERAL: PAINLEVEL_OUTOF10: 7

## 2025-03-20 ASSESSMENT — PAIN - FUNCTIONAL ASSESSMENT: PAIN_FUNCTIONAL_ASSESSMENT: 0-10

## 2025-03-20 ASSESSMENT — PAIN DESCRIPTION - LOCATION: LOCATION: PELVIS
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
## 2025-03-20 NOTE — ED TRIAGE NOTES
TRIAGE NOTE   I saw the patient as the Clinician in Triage and performed a brief history and physical exam, established acuity, and ordered appropriate tests to develop basic plan of care. Patient will be seen by an [facility], resident and/or physician who will independently evaluate the patient. Please see subsequent provider notes for further details and disposition.     Brief HPI: In brief, Theodora Montez is a 49 y.o. female that presents for heavy vaginal bleeding for 2 days.  No HRT.  No prior history of GYN disorder.  No ovarian cyst.  No dizziness or syncope.  Not sexually active.  Denies pregnancy.  No history of uterine fibroid.  No pregnancies.    Focused Physical exam:   Vital signs stable.  No tachycardia or tachypnea.  Alert coherent normal mental status.  Abdomen soft mild diffuse discomfort with no localized tenderness.  GYN exam deferred in triage.    Plan/MDM:   Workup initiated.  Stable pending bed availability further evaluation.  Please see subsequent provider note for further details and disposition

## 2025-03-21 ENCOUNTER — APPOINTMENT (OUTPATIENT)
Dept: OPHTHALMOLOGY | Facility: CLINIC | Age: 50
End: 2025-03-21
Payer: COMMERCIAL

## 2025-03-21 VITALS
SYSTOLIC BLOOD PRESSURE: 148 MMHG | TEMPERATURE: 98.2 F | BODY MASS INDEX: 37.22 KG/M2 | WEIGHT: 210.1 LBS | RESPIRATION RATE: 16 BRPM | OXYGEN SATURATION: 99 % | DIASTOLIC BLOOD PRESSURE: 95 MMHG | HEART RATE: 85 BPM

## 2025-03-21 NOTE — DISCHARGE INSTRUCTIONS
You were seen in the ED for irregular heavy vaginal bleeding. Your ultrasound showed a nonspecific mass on your ovary so we decided to proceed with CT. CT shows a likely dermoid cyst in the pelvis, along with left ovarian cyst. Also shows a large hiatal hernia and vague liver abnormalities. Follow up with your primary doctor and gynecologist. Monitor symptoms closely. Use the prescription if heavy bleeding and clotting continues.  Follow-up closely with your OB/GYN and GI to determine what other treatment or intervention is needed. Return to the ED with worsening bleeding, abdominal pain, or other concerning symptoms.
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
## 2025-03-24 ENCOUNTER — APPOINTMENT (OUTPATIENT)
Dept: INTEGRATIVE MEDICINE | Facility: CLINIC | Age: 50
End: 2025-03-24

## 2025-03-24 DIAGNOSIS — M25.512 CHRONIC LEFT SHOULDER PAIN: Primary | ICD-10-CM

## 2025-03-24 DIAGNOSIS — R51.9 HEADACHE: ICD-10-CM

## 2025-03-24 DIAGNOSIS — M25.562 LEFT KNEE PAIN, UNSPECIFIED CHRONICITY: ICD-10-CM

## 2025-03-24 DIAGNOSIS — G89.29 CHRONIC LEFT SHOULDER PAIN: Primary | ICD-10-CM

## 2025-03-24 DIAGNOSIS — M54.2 NECK PAIN: ICD-10-CM

## 2025-03-24 PROCEDURE — ACUGR GROUP ACUPUNCTURE: Performed by: ACUPUNCTURIST
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
## 2025-03-26 ENCOUNTER — HOSPITAL ENCOUNTER (OUTPATIENT)
Dept: RADIOLOGY | Facility: CLINIC | Age: 50
Discharge: HOME | End: 2025-03-26
Payer: COMMERCIAL

## 2025-03-26 ENCOUNTER — ALLIED HEALTH (OUTPATIENT)
Dept: INTEGRATIVE MEDICINE | Facility: CLINIC | Age: 50
End: 2025-03-26

## 2025-03-26 ENCOUNTER — OFFICE VISIT (OUTPATIENT)
Dept: PRIMARY CARE | Facility: CLINIC | Age: 50
End: 2025-03-26
Payer: COMMERCIAL

## 2025-03-26 VITALS — WEIGHT: 207 LBS | SYSTOLIC BLOOD PRESSURE: 118 MMHG | DIASTOLIC BLOOD PRESSURE: 84 MMHG | BODY MASS INDEX: 36.67 KG/M2

## 2025-03-26 DIAGNOSIS — M25.512 ACUTE PAIN OF LEFT SHOULDER: ICD-10-CM

## 2025-03-26 DIAGNOSIS — M25.562 ACUTE PAIN OF LEFT KNEE: ICD-10-CM

## 2025-03-26 DIAGNOSIS — N92.4 EXCESSIVE BLEEDING IN PREMENOPAUSAL PERIOD: ICD-10-CM

## 2025-03-26 DIAGNOSIS — M25.512 CHRONIC LEFT SHOULDER PAIN: Primary | ICD-10-CM

## 2025-03-26 DIAGNOSIS — G89.29 CHRONIC LEFT SHOULDER PAIN: Primary | ICD-10-CM

## 2025-03-26 DIAGNOSIS — M25.562 LEFT KNEE PAIN, UNSPECIFIED CHRONICITY: ICD-10-CM

## 2025-03-26 DIAGNOSIS — M25.551 PAIN OF RIGHT HIP: ICD-10-CM

## 2025-03-26 DIAGNOSIS — M54.50 MIDLINE LOW BACK PAIN, UNSPECIFIED CHRONICITY, UNSPECIFIED WHETHER SCIATICA PRESENT: ICD-10-CM

## 2025-03-26 DIAGNOSIS — I10 PRIMARY HYPERTENSION: ICD-10-CM

## 2025-03-26 DIAGNOSIS — K76.9 LIVER LESION: Primary | ICD-10-CM

## 2025-03-26 PROCEDURE — 99215 OFFICE O/P EST HI 40 MIN: CPT | Performed by: INTERNAL MEDICINE

## 2025-03-26 PROCEDURE — 3079F DIAST BP 80-89 MM HG: CPT | Performed by: INTERNAL MEDICINE

## 2025-03-26 PROCEDURE — 73502 X-RAY EXAM HIP UNI 2-3 VIEWS: CPT | Mod: RT

## 2025-03-26 PROCEDURE — 72100 X-RAY EXAM L-S SPINE 2/3 VWS: CPT

## 2025-03-26 PROCEDURE — 3074F SYST BP LT 130 MM HG: CPT | Performed by: INTERNAL MEDICINE

## 2025-03-26 PROCEDURE — 73030 X-RAY EXAM OF SHOULDER: CPT | Mod: LT

## 2025-03-26 PROCEDURE — 73560 X-RAY EXAM OF KNEE 1 OR 2: CPT | Mod: LT

## 2025-03-26 PROCEDURE — ACUGR GROUP ACUPUNCTURE: Performed by: ACUPUNCTURIST

## 2025-03-26 PROCEDURE — 1036F TOBACCO NON-USER: CPT | Performed by: INTERNAL MEDICINE
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
## 2025-03-26 NOTE — PROGRESS NOTES
Acupuncture Visit:     Subjective   Patient ID: Theodora Montez is a 49 y.o. female who presents for No chief complaint on file.  Seeking continued support for left sided neck, shoulder and knee pain.  Reported seeing chiropractor immediately post acu tx who noted difference in neck and shoulder.          Session Information  Is this acupuncture treatment being billed to the patient's insurance company: No  Visit Type: Follow-up visit         Review of Systems         Provider reviewed plan for the acupuncture session, precautions and contraindications. Patient/guardian/hospital staff has given consent to treat with full understanding of what to expect during the session. Before acupuncture began, provider explained to the patient to communicate at any time if the procedure was causing discomfort past their tolerance level. Patient agreed to advise acupuncturist. The acupuncturist counseled the patient on the risks of acupuncture treatment including pain, infection, bleeding, and no relief of pain. The patient was positioned comfortably. There was no evidence of infection at the site of needle insertions.    Objective   Physical Exam         Treatment Plan  Treatment Goals: Pain management    Acupuncture Treatment  Patient Position: Seated and reclining  Needle Guage: 40 guage /.16/ Red seirin, 36 guage /.20/ Blue seirin  Body Points - Left: sp 6, ,9, 10, gb 33, 34, lr 8, heding, xiyan, st 35, trapx3  Needle Count In: 12  Needle Count Out: 12  Needle Retention Time (min): 25 minutes  Total Face to Face Time (min): 25 minutes              Assessment/Plan

## 2025-03-27 LAB
25(OH)D3+25(OH)D2 SERPL-MCNC: 81 NG/ML (ref 30–100)
A ALTERNATA IGE QN: <0.1 KU/L
A ALTERNATA IGE RAST: 0
A FUMIGATUS IGE QN: <0.1 KU/L
A FUMIGATUS IGE RAST: 0
ANION GAP SERPL CALCULATED.4IONS-SCNC: 11 MMOL/L (CALC) (ref 7–17)
BASOPHILS # BLD AUTO: 37 CELLS/UL (ref 0–200)
BASOPHILS NFR BLD AUTO: 0.4 %
BERMUDA GRASS IGE QN: <0.1 KU/L
BERMUDA GRASS IGE RAST: 0
BOXELDER IGE QN: <0.1 KU/L
BOXELDER IGE RAST: 0
BUN SERPL-MCNC: 11 MG/DL (ref 7–25)
BUN/CREAT SERPL: NORMAL (CALC) (ref 6–22)
C HERBARUM IGE QN: <0.1 KU/L
C HERBARUM IGE RAST: 0
CALCIUM SERPL-MCNC: 9.3 MG/DL (ref 8.6–10.2)
CALIF WALNUT POLN IGE QN: <0.1 KU/L
CALIF WALNUT POLN IGE RAST: 0
CAT DANDER IGE QN: <0.1 KU/L
CAT DANDER IGE RAST: 0
CHLORIDE SERPL-SCNC: 105 MMOL/L (ref 98–110)
CHOLEST SERPL-MCNC: 163 MG/DL
CHOLEST/HDLC SERPL: 2.7 (CALC)
CMN PIGWEED IGE QN: <0.1 KU/L
CMN PIGWEED IGE RAST: 0
CO2 SERPL-SCNC: 22 MMOL/L (ref 20–32)
COMMON RAGWEED IGE QN: <0.1 KU/L
COMMON RAGWEED IGE RAST: 0
COTTONWOOD IGE QN: <0.1 KU/L
COTTONWOOD IGE RAST: 0
CREAT SERPL-MCNC: 0.96 MG/DL (ref 0.5–0.99)
D FARINAE IGE QN: <0.1 KU/L
D FARINAE IGE RAST: 0
D PTERONYSS IGE QN: <0.1 KU/L
D PTERONYSS IGE RAST: 0
DOG DANDER IGE QN: <0.1 KU/L
DOG DANDER IGE RAST: 0
EGFRCR SERPLBLD CKD-EPI 2021: 73 ML/MIN/1.73M2
EOSINOPHIL # BLD AUTO: 294 CELLS/UL (ref 15–500)
EOSINOPHIL NFR BLD AUTO: 3.2 %
ERYTHROCYTE [DISTWIDTH] IN BLOOD BY AUTOMATED COUNT: 11.4 % (ref 11–15)
EST. AVERAGE GLUCOSE BLD GHB EST-MCNC: 94 MG/DL
EST. AVERAGE GLUCOSE BLD GHB EST-SCNC: 5.2 MMOL/L
GLUCOSE SERPL-MCNC: 78 MG/DL (ref 65–99)
HBA1C MFR BLD: 4.9 % OF TOTAL HGB
HCT VFR BLD AUTO: 45.3 % (ref 35–45)
HDLC SERPL-MCNC: 60 MG/DL
HGB BLD-MCNC: 15.1 G/DL (ref 11.7–15.5)
IGA SERPL-MCNC: 259 MG/DL (ref 47–310)
IGE SERPL-ACNC: 5 KU/L
IGG SERPL-MCNC: 841 MG/DL (ref 600–1640)
IGM SERPL-MCNC: 105 MG/DL (ref 50–300)
LDLC SERPL CALC-MCNC: 84 MG/DL (CALC)
LONDON PLANE IGE QN: <0.1 KU/L
LONDON PLANE IGE RAST: 0
LYMPHOCYTES # BLD AUTO: 3036 CELLS/UL (ref 850–3900)
LYMPHOCYTES NFR BLD AUTO: 33 %
MCH RBC QN AUTO: 31.1 PG (ref 27–33)
MCHC RBC AUTO-ENTMCNC: 33.3 G/DL (ref 32–36)
MCV RBC AUTO: 93.2 FL (ref 80–100)
MONOCYTES # BLD AUTO: 561 CELLS/UL (ref 200–950)
MONOCYTES NFR BLD AUTO: 6.1 %
MOUSE URINE PROT IGE QN: <0.1 KU/L
MOUSE URINE PROT IGE RAST: 0
MT JUNIPER IGE QN: <0.1 KU/L
MT JUNIPER IGE RAST: 0
NEUTROPHILS # BLD AUTO: 5272 CELLS/UL (ref 1500–7800)
NEUTROPHILS NFR BLD AUTO: 57.3 %
NONHDLC SERPL-MCNC: 103 MG/DL (CALC)
P NOTATUM IGE QN: <0.1 KU/L
P NOTATUM IGE RAST: 0
PECAN/HICK TREE IGE QN: <0.1 KU/L
PECAN/HICK TREE IGE RAST: 0
PLATELET # BLD AUTO: 415 THOUSAND/UL (ref 140–400)
PMV BLD REES-ECKER: 8.5 FL (ref 7.5–12.5)
POTASSIUM SERPL-SCNC: 4 MMOL/L (ref 3.5–5.3)
RBC # BLD AUTO: 4.86 MILLION/UL (ref 3.8–5.1)
REF LAB TEST REF RANGE: NORMAL
ROACH IGE QN: <0.1 KU/L
ROACH IGE RAST: 0
SALTWORT IGE QN: <0.1 KU/L
SALTWORT IGE RAST: 0
SHEEP SORREL IGE QN: <0.1 KU/L
SHEEP SORREL IGE RAST: 0
SILVER BIRCH IGE QN: <0.1 KU/L
SILVER BIRCH IGE RAST: 0
SODIUM SERPL-SCNC: 138 MMOL/L (ref 135–146)
TIMOTHY IGE QN: <0.1 KU/L
TIMOTHY IGE RAST: 0
TRIGL SERPL-MCNC: 93 MG/DL
TSH SERPL-ACNC: 3.16 MIU/L
WBC # BLD AUTO: 9.2 THOUSAND/UL (ref 3.8–10.8)
WHITE ASH IGE QN: <0.1 KU/L
WHITE ASH IGE RAST: 0
WHITE ELM IGE QN: <0.1 KU/L
WHITE ELM IGE RAST: 0
WHITE MULBERRY IGE QN: <0.1 KU/L
WHITE MULBERRY IGE RAST: 0
WHITE OAK IGE QN: <0.1 KU/L
WHITE OAK IGE RAST: 0

## 2025-03-31 ENCOUNTER — OFFICE VISIT (OUTPATIENT)
Dept: ENDOCRINOLOGY | Facility: CLINIC | Age: 50
End: 2025-03-31
Payer: COMMERCIAL

## 2025-03-31 ENCOUNTER — APPOINTMENT (OUTPATIENT)
Dept: INTEGRATIVE MEDICINE | Facility: CLINIC | Age: 50
End: 2025-03-31
Payer: COMMERCIAL

## 2025-03-31 ENCOUNTER — APPOINTMENT (OUTPATIENT)
Dept: ENDOCRINOLOGY | Facility: CLINIC | Age: 50
End: 2025-03-31
Payer: COMMERCIAL

## 2025-03-31 VITALS — WEIGHT: 211 LBS | DIASTOLIC BLOOD PRESSURE: 78 MMHG | BODY MASS INDEX: 37.38 KG/M2 | SYSTOLIC BLOOD PRESSURE: 122 MMHG

## 2025-03-31 DIAGNOSIS — G89.29 CHRONIC LEFT SHOULDER PAIN: ICD-10-CM

## 2025-03-31 DIAGNOSIS — M25.562 LEFT KNEE PAIN, UNSPECIFIED CHRONICITY: Primary | ICD-10-CM

## 2025-03-31 DIAGNOSIS — E78.5 HYPERLIPIDEMIA, UNSPECIFIED HYPERLIPIDEMIA TYPE: ICD-10-CM

## 2025-03-31 DIAGNOSIS — G47.33 OSA (OBSTRUCTIVE SLEEP APNEA): ICD-10-CM

## 2025-03-31 DIAGNOSIS — L65.9 ALOPECIA: Primary | ICD-10-CM

## 2025-03-31 DIAGNOSIS — M25.512 CHRONIC LEFT SHOULDER PAIN: ICD-10-CM

## 2025-03-31 DIAGNOSIS — I10 HYPERTENSION, UNSPECIFIED TYPE: ICD-10-CM

## 2025-03-31 PROCEDURE — 99214 OFFICE O/P EST MOD 30 MIN: CPT | Performed by: INTERNAL MEDICINE

## 2025-03-31 PROCEDURE — 3078F DIAST BP <80 MM HG: CPT | Performed by: INTERNAL MEDICINE

## 2025-03-31 PROCEDURE — 3074F SYST BP LT 130 MM HG: CPT | Performed by: INTERNAL MEDICINE

## 2025-03-31 PROCEDURE — ACUGR GROUP ACUPUNCTURE: Performed by: ACUPUNCTURIST

## 2025-03-31 NOTE — PROGRESS NOTES
Acupuncture Visit:     Subjective   Patient ID: Theodora Montez is a 49 y.o. female who presents for No chief complaint on file.  PT reported that she was able to shave armpit and legs which she has not previously been able to do due to pain.  Knee is feeling good. Seeking continued support       Session Information  Is this acupuncture treatment being billed to the patient's insurance company: No  Visit Type: Follow-up visit         Review of Systems         Provider reviewed plan for the acupuncture session, precautions and contraindications. Patient/guardian/hospital staff has given consent to treat with full understanding of what to expect during the session. Before acupuncture began, provider explained to the patient to communicate at any time if the procedure was causing discomfort past their tolerance level. Patient agreed to advise acupuncturist. The acupuncturist counseled the patient on the risks of acupuncture treatment including pain, infection, bleeding, and no relief of pain. The patient was positioned comfortably. There was no evidence of infection at the site of needle insertions.    Objective   Physical Exam         Treatment Plan  Treatment Goals: Pain management    Acupuncture Treatment  Patient Position: Seated and reclining  Needle Guage: 40 guage /.16/ Red seirin, 36 guage /.20/ Blue seirin  Body Points - Left: trap x3, sj 15, shoulder pt, li 4, heding xiyan, st 35, sp 10, 9, gb 34  Needle Count In: 12  Needle Count Out: 12  Needle Retention Time (min): 25 minutes  Total Face to Face Time (min): 25 minutes              Assessment/Plan
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
## 2025-04-03 ENCOUNTER — TELEPHONE (OUTPATIENT)
Dept: SURGERY | Facility: CLINIC | Age: 50
End: 2025-04-03

## 2025-04-03 ENCOUNTER — APPOINTMENT (OUTPATIENT)
Dept: ENDOCRINOLOGY | Facility: CLINIC | Age: 50
End: 2025-04-03
Payer: COMMERCIAL

## 2025-04-03 PROCEDURE — RXMED WILLOW AMBULATORY MEDICATION CHARGE

## 2025-04-03 NOTE — TELEPHONE ENCOUNTER
Left a detailed message with patient that she will need to be on a clear liquid diet 24 hours prior to her procedure with Dr. Robin.  Informed patient that I would [facility] and provided her with my phone number for any further questions or concerns.

## 2025-04-04 ENCOUNTER — APPOINTMENT (OUTPATIENT)
Dept: GASTROENTEROLOGY | Facility: HOSPITAL | Age: 50
End: 2025-04-04
Payer: COMMERCIAL

## 2025-04-04 ENCOUNTER — APPOINTMENT (OUTPATIENT)
Dept: OBSTETRICS AND GYNECOLOGY | Facility: CLINIC | Age: 50
End: 2025-04-04
Payer: COMMERCIAL

## 2025-04-04 VITALS
SYSTOLIC BLOOD PRESSURE: 138 MMHG | WEIGHT: 211 LBS | DIASTOLIC BLOOD PRESSURE: 86 MMHG | HEIGHT: 63 IN | BODY MASS INDEX: 37.39 KG/M2

## 2025-04-04 DIAGNOSIS — N92.4 ABNORMAL PERIMENOPAUSAL BLEEDING: ICD-10-CM

## 2025-04-04 DIAGNOSIS — D36.9 DERMOID CYST: Primary | ICD-10-CM

## 2025-04-04 PROCEDURE — 99213 OFFICE O/P EST LOW 20 MIN: CPT | Performed by: OBSTETRICS & GYNECOLOGY

## 2025-04-04 PROCEDURE — 3075F SYST BP GE 130 - 139MM HG: CPT | Performed by: OBSTETRICS & GYNECOLOGY

## 2025-04-04 PROCEDURE — 3079F DIAST BP 80-89 MM HG: CPT | Performed by: OBSTETRICS & GYNECOLOGY

## 2025-04-04 PROCEDURE — 1036F TOBACCO NON-USER: CPT | Performed by: OBSTETRICS & GYNECOLOGY

## 2025-04-04 PROCEDURE — 3008F BODY MASS INDEX DOCD: CPT | Performed by: OBSTETRICS & GYNECOLOGY

## 2025-04-04 ASSESSMENT — PAIN SCALES - GENERAL: PAINLEVEL_OUTOF10: 0-NO PAIN

## 2025-04-04 NOTE — PROGRESS NOTES
"Assessment     PLAN  1. Dermoid cyst (Primary)  - discussed dermoid cyst that was seen on CT and pelvic ultrasound. Discussed option for surveillance vs laparoscopic cystectomy. 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 Abnormal perimenopausal bleeding  - discussed likely related to perimenopause. Discussed option to monitor vs treat with hormonal options. She prefers to monitor for now    Please return after pelvic ultrasound    Cathy Lawson MD      Subjective     Theodora Montez is a 49 y.o. female who is here for ER follow up   PCP = Tha Cheng DO    - patient went to ER for heavy and prolonged vaginal bleeding. She was fount o have a dermoid cyst on ovary at that time.     PMH, PSH, FH, SH, medications and allergies reviewed and edited as needed.      Objective   /86   Ht 1.6 m (5' 3\")   Wt 95.7 kg (211 lb)   BMI 37.38 kg/m²      General:   Alert and oriented, in no acute distress     "

## 2025-04-07 ENCOUNTER — PHARMACY VISIT (OUTPATIENT)
Dept: PHARMACY | Facility: CLINIC | Age: 50
End: 2025-04-07
Payer: COMMERCIAL

## 2025-04-07 ENCOUNTER — APPOINTMENT (OUTPATIENT)
Dept: INTEGRATIVE MEDICINE | Facility: CLINIC | Age: 50
End: 2025-04-07

## 2025-04-07 ENCOUNTER — APPOINTMENT (OUTPATIENT)
Dept: PHARMACY | Facility: HOSPITAL | Age: 50
End: 2025-04-07
Payer: COMMERCIAL

## 2025-04-07 DIAGNOSIS — N18.30 STAGE 3 CHRONIC KIDNEY DISEASE, UNSPECIFIED WHETHER STAGE 3A OR 3B CKD (MULTI): ICD-10-CM

## 2025-04-07 DIAGNOSIS — G89.29 OTHER CHRONIC PAIN: Primary | ICD-10-CM

## 2025-04-07 DIAGNOSIS — M79.7 FIBROMYALGIA: Primary | ICD-10-CM

## 2025-04-07 NOTE — PROGRESS NOTES
Subjective   Patient ID: Theodora Montez is a 49 y.o. female who presents for No chief complaint on file..    Referring Provider: july ROSS  HPI: CKD stage 2/a1. last EGFR 73 sCr 0.96   HTN:  /86  Medications  Amlodipine 2.5mg every day    Glucose:  A1C 5.0  Medications  Metformin 1000 xr bid  Ozempic  1mg qweek    HLD:  LDL 81  On statin? Atorvastatin 10mg     Medications reviewed for appropriate dosing in setting of CKD  Recommended changes:  -no adjustments needed at this time    Objective     There were no vitals taken for this visit.     Labs  Lab Results   Component Value Date    BILITOT 0.5 03/20/2025    CALCIUM 9.3 03/26/2025    CO2 22 03/26/2025     03/26/2025    CREATININE 0.96 03/26/2025    GLUCOSE 78 03/26/2025    ALKPHOS 80 03/20/2025    K 4.0 03/26/2025    PROT 7.3 03/20/2025     03/26/2025    AST 23 03/20/2025    ALT 53 (H) 03/20/2025    BUN 11 03/26/2025    ANIONGAP 11 03/26/2025    PHOS 3.3 01/14/2025    ALBUMIN 4.4 03/20/2025     Lab Results   Component Value Date    TRIG 93 03/26/2025    CHOL 163 03/26/2025    LDLCALC 84 03/26/2025    HDL 60 03/26/2025     Lab Results   Component Value Date    HGBA1C 4.9 03/26/2025       Current Outpatient Medications on File Prior to Visit   Medication Sig Dispense Refill    albuterol 90 mcg/actuation inhaler Inhale 2 puffs every 6 hours if needed for wheezing. 18 g 2    alpha lipoic acid 200 mg tablet Take 1 tablet (200 mg) by mouth once daily.      amitriptyline (Elavil) 10 mg tablet Take 1 tablet (10 mg) by mouth once daily at bedtime. 90 tablet 1    amLODIPine (Norvasc) 2.5 mg tablet Take 1 tablet (2.5 mg) by mouth once daily. 90 tablet 1    atorvastatin (Lipitor) 10 mg tablet Take 1 tablet (10 mg) by mouth once daily. 90 tablet 1    b complex vitamins capsule TAKE 1 CAPSULE BY MOUTH TWICE A  capsule 4    buPROPion XL (Wellbutrin XL) 150 mg 24 hr tablet Take 1 tablet (150 mg) by mouth once daily in the morning. 90 tablet  1    buPROPion XL (Wellbutrin XL) 300 mg 24 hr tablet Take 1 tablet (300 mg) by mouth once daily. Do not crush, chew, or split. 90 tablet 1    cholecalciferol, vitamin D3, (VITAMIN D3 ORAL) Take by mouth.      COQ10, UBIQUINOL, ORAL Take by mouth.      cyclobenzaprine (Flexeril) 5 mg tablet Take 1 tablet (5 mg) by mouth 3 times a day as needed for muscle spasms. 270 tablet 1    Farxiga 10 mg tablet Take 1 tablet (10 mg) by mouth once daily. TAKE ONE TABLET BY MOUTH ONCE DAILY IN THE MORNING 30 tablet 11    gabapentin (Neurontin) 100 mg capsule Take 2 capsules (200 mg) by mouth 3 times a day. 270 capsule 1    ginseng 100 mg capsule Take 200 mg by mouth once daily.      metFORMIN XR (Glucophage-XR) 500 mg 24 hr tablet Take 2 tablets (1,000 mg) by mouth 2 times daily (morning and late afternoon). Do not crush, chew, or split. 360 tablet 3    milnacipran (SavElla) 50 MG tablet Take 2 tablets (100 mg) by mouth 2 times a day. 180 tablet 4    miscellaneous medical supply (Blood Pressure Cuff) misc 1 Units once daily. Prefer automatic Omeron Arm Cuff; Please provide patient with education on how to use the cuff; Check blood pressure daily and record for the week before clinic visits 1 each 0    semaglutide (OZEMPIC) 1 mg/dose (4 mg/3 mL) pen injector Inject 1 mg under the skin 1 (one) time per week. 9 mL 1    sodium,potassium,mag sulfates (Suprep Bowel Prep Kit) 17.5-3.13-1.6 gram solution Take one bottle twice as directed by the prep instructions 354 mL 0    topiramate (Topamax) 50 mg tablet Take 2 tablets (100 mg) by mouth once daily. 180 tablet 1     No current facility-administered medications on file prior to visit.        Assessment/Plan   PATIENT EDUCATION/DISCUSSION:  Farxiga Education:    - Counseled patient on Farxiga MOA, expectations, side effects, duration of therapy, administration, and monitoring parameters.  - Reviewed the benefits of SGLT-2i therapy, such as glycemic control and kidney and CV protection.  -  Advised patient to practice proper  hygiene to reduce risk of UTIs or yeast infections.  - Advised patient to maintain adequate fluid intake to remain hydrated while on SGLT2i therapy.  - Answered all patient questions and concerns.  - farxiga $25 enrolled in copay card  -now 1 month on farxiga no noted issues or concerns.   - follow up with dr hart in may    _______________________________________________________________________  PLAN  1. Continue farxiga 10mg every day  2. Prescription sent to [facility] pharmacy for assistance on authorization and copay. Medication will be mailed to patient.    Ric Christina, PharmD    Continue all meds under the continuation of care with the referring provider and clinical pharmacy team.

## 2025-04-11 ENCOUNTER — APPOINTMENT (OUTPATIENT)
Dept: OBSTETRICS AND GYNECOLOGY | Facility: CLINIC | Age: 50
End: 2025-04-11
Payer: COMMERCIAL

## 2025-04-11 DIAGNOSIS — D75.839 THROMBOCYTOSIS: Primary | ICD-10-CM

## 2025-04-16 ENCOUNTER — HOSPITAL ENCOUNTER (OUTPATIENT)
Dept: RADIOLOGY | Facility: CLINIC | Age: 50
Discharge: HOME | End: 2025-04-16
Payer: COMMERCIAL

## 2025-04-16 ENCOUNTER — TELEPHONE (OUTPATIENT)
Dept: GASTROENTEROLOGY | Facility: CLINIC | Age: 50
End: 2025-04-16

## 2025-04-16 ENCOUNTER — OFFICE VISIT (OUTPATIENT)
Dept: GASTROENTEROLOGY | Facility: CLINIC | Age: 50
End: 2025-04-16
Payer: COMMERCIAL

## 2025-04-16 ENCOUNTER — APPOINTMENT (OUTPATIENT)
Dept: INTEGRATIVE MEDICINE | Facility: CLINIC | Age: 50
End: 2025-04-16
Payer: COMMERCIAL

## 2025-04-16 VITALS
HEART RATE: 78 BPM | SYSTOLIC BLOOD PRESSURE: 144 MMHG | TEMPERATURE: 96.4 F | DIASTOLIC BLOOD PRESSURE: 87 MMHG | HEIGHT: 63 IN | BODY MASS INDEX: 35.97 KG/M2 | WEIGHT: 203 LBS

## 2025-04-16 DIAGNOSIS — R93.5 ABNORMAL CT OF THE ABDOMEN: ICD-10-CM

## 2025-04-16 DIAGNOSIS — M25.512 CHRONIC LEFT SHOULDER PAIN: ICD-10-CM

## 2025-04-16 DIAGNOSIS — M25.562 LEFT KNEE PAIN, UNSPECIFIED CHRONICITY: Primary | ICD-10-CM

## 2025-04-16 DIAGNOSIS — K76.9 LIVER LESION: ICD-10-CM

## 2025-04-16 DIAGNOSIS — G89.29 CHRONIC LEFT SHOULDER PAIN: ICD-10-CM

## 2025-04-16 DIAGNOSIS — K44.9 HIATAL HERNIA: Primary | ICD-10-CM

## 2025-04-16 PROCEDURE — A9575 INJ GADOTERATE MEGLUMI 0.1ML: HCPCS | Performed by: INTERNAL MEDICINE

## 2025-04-16 PROCEDURE — ACUGR GROUP ACUPUNCTURE: Performed by: ACUPUNCTURIST

## 2025-04-16 PROCEDURE — 74183 MRI ABD W/O CNTR FLWD CNTR: CPT

## 2025-04-16 PROCEDURE — 99214 OFFICE O/P EST MOD 30 MIN: CPT | Performed by: NURSE PRACTITIONER

## 2025-04-16 PROCEDURE — 3079F DIAST BP 80-89 MM HG: CPT | Performed by: NURSE PRACTITIONER

## 2025-04-16 PROCEDURE — 3008F BODY MASS INDEX DOCD: CPT | Performed by: NURSE PRACTITIONER

## 2025-04-16 PROCEDURE — 2550000001 HC RX 255 CONTRASTS: Performed by: INTERNAL MEDICINE

## 2025-04-16 PROCEDURE — 3077F SYST BP >= 140 MM HG: CPT | Performed by: NURSE PRACTITIONER

## 2025-04-16 RX ORDER — GADOTERATE MEGLUMINE 376.9 MG/ML
18 INJECTION INTRAVENOUS
Status: COMPLETED | OUTPATIENT
Start: 2025-04-16 | End: 2025-04-16

## 2025-04-16 RX ADMIN — GADOTERATE MEGLUMINE 18 ML: 376.9 INJECTION INTRAVENOUS at 14:31

## 2025-04-16 NOTE — PROGRESS NOTES
Acupuncture Visit:     Subjective   Patient ID: Theodora Montez is a 49 y.o. female who presents for No chief complaint on file.  PT reporting she is now able to zip up dresses and overall ROM in shoulder has improved greatly.  Seeking continued support for left knee and shoulder pain        Session Information  Is this acupuncture treatment being billed to the patient's insurance company: No  Visit Type: Follow-up visit         Review of Systems         Provider reviewed plan for the acupuncture session, precautions and contraindications. Patient/guardian/hospital staff has given consent to treat with full understanding of what to expect during the session. Before acupuncture began, provider explained to the patient to communicate at any time if the procedure was causing discomfort past their tolerance level. Patient agreed to advise acupuncturist. The acupuncturist counseled the patient on the risks of acupuncture treatment including pain, infection, bleeding, and no relief of pain. The patient was positioned comfortably. There was no evidence of infection at the site of needle insertions.    Objective   Physical Exam         Treatment Plan  Treatment Goals: Pain management    Acupuncture Treatment  Patient Position: Seated and reclining  Needle Guage: 40 guage /.16/ Red seirin, 36 guage /.20/ Blue seirin  Body Points - Left: gb 34, sp 9, lr 8, heding, sp 10, xiyan, yin linares, li 10, leroy 3, shoulder pt, li 15  Body Points - Bilateral: sp 6, st 36  Needle Count In: 15  Needle Count Out: 15  Needle Retention Time (min): 25 minutes  Total Face to Face Time (min): 25 minutes              Assessment/Plan

## 2025-04-16 NOTE — TELEPHONE ENCOUNTER
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 Patient was previously scheduled for colonoscopy with Dr. Robin on 4/4/2025, but cancelled it. Please review. Thank you.
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
## 2025-04-16 NOTE — PATIENT INSTRUCTIONS
Liver cysts please proceed with the MRI of your liver scheduled for today    Screening colonoscopy I would recommend rescheduling this    Large hiatal hernia I will also order an EGD for evaluation of your upper GI tract as well. You can use pepcid twice daily as needed.    I will see you back for follow up at [facility]  Scheduling [phone]

## 2025-04-17 NOTE — TELEPHONE ENCOUNTER
Left a detailed message with patient that Dr. Robin's next available for a double endoscopy procedure would be May 23.  Provided patient with my direct phone number for return call.

## 2025-04-21 ENCOUNTER — APPOINTMENT (OUTPATIENT)
Dept: INTEGRATIVE MEDICINE | Facility: CLINIC | Age: 50
End: 2025-04-21
Payer: COMMERCIAL

## 2025-04-22 ENCOUNTER — APPOINTMENT (OUTPATIENT)
Dept: OBSTETRICS AND GYNECOLOGY | Facility: CLINIC | Age: 50
End: 2025-04-22
Payer: COMMERCIAL

## 2025-04-28 ENCOUNTER — APPOINTMENT (OUTPATIENT)
Dept: INTEGRATIVE MEDICINE | Facility: CLINIC | Age: 50
End: 2025-04-28
Payer: COMMERCIAL

## 2025-04-28 DIAGNOSIS — M25.561 CHRONIC PAIN OF BOTH KNEES: Primary | ICD-10-CM

## 2025-04-28 DIAGNOSIS — G89.29 CHRONIC PAIN OF BOTH KNEES: Primary | ICD-10-CM

## 2025-04-28 DIAGNOSIS — M54.2 NECK PAIN: ICD-10-CM

## 2025-04-28 DIAGNOSIS — M25.562 CHRONIC PAIN OF BOTH KNEES: Primary | ICD-10-CM

## 2025-04-28 PROCEDURE — ACUGR GROUP ACUPUNCTURE: Performed by: ACUPUNCTURIST

## 2025-04-28 NOTE — PROGRESS NOTES
Acupuncture Visit:     Subjective   Patient ID: Theodora Montez is a 49 y.o. female who presents for No chief complaint on file.  Seeking support for bilateral neck and knee pain.  Neck also presenting with limited ROM        Session Information  Is this acupuncture treatment being billed to the patient's insurance company: No  Visit Type: Follow-up visit         Review of Systems         Provider reviewed plan for the acupuncture session, precautions and contraindications. Patient/guardian/hospital staff has given consent to treat with full understanding of what to expect during the session. Before acupuncture began, provider explained to the patient to communicate at any time if the procedure was causing discomfort past their tolerance level. Patient agreed to advise acupuncturist. The acupuncturist counseled the patient on the risks of acupuncture treatment including pain, infection, bleeding, and no relief of pain. The patient was positioned comfortably. There was no evidence of infection at the site of needle insertions.    Objective   Physical Exam         Treatment Plan  Treatment Goals: Pain management    Acupuncture Treatment  Patient Position: Seated and reclining  Needle Guage: 40 guage /.16/ Red seirin, 36 guage /.20/ Blue seirin  Body Points - Bilateral: sp 6, 9, st 36, gb 21, heding xiyan, st 35, gb 34, gb 20, lr 8, li 4  Needle Count In: 20  Needle Count Out: 20  Needle Retention Time (min): 25 minutes  Total Face to Face Time (min): 25 minutes              Assessment/Plan

## 2025-04-29 ENCOUNTER — APPOINTMENT (OUTPATIENT)
Dept: PRIMARY CARE | Facility: CLINIC | Age: 50
End: 2025-04-29
Payer: COMMERCIAL

## 2025-04-29 SDOH — ECONOMIC STABILITY: FOOD INSECURITY: WITHIN THE PAST 12 MONTHS, YOU WORRIED THAT YOUR FOOD WOULD RUN OUT BEFORE YOU GOT MONEY TO BUY MORE.: NEVER TRUE

## 2025-04-29 SDOH — SOCIAL STABILITY: SOCIAL NETWORK: PROMIS ABILITY TO PARTICIPATE IN SOCIAL ROLES & ACTIVITIES T-SCORE: 34

## 2025-04-29 SDOH — ECONOMIC STABILITY: FOOD INSECURITY: WITHIN THE PAST 12 MONTHS, THE FOOD YOU BOUGHT JUST DIDN'T LAST AND YOU DIDN'T HAVE MONEY TO GET MORE.: NEVER TRUE

## 2025-04-29 SDOH — SOCIAL STABILITY: SOCIAL NETWORK

## 2025-04-29 SDOH — SOCIAL STABILITY: SOCIAL NETWORK: I HAVE TROUBLE DOING ALL OF MY USUAL WORK (INCLUDE WORK AT HOME): ALWAYS

## 2025-04-29 SDOH — SOCIAL STABILITY: SOCIAL NETWORK: I HAVE TROUBLE DOING ALL OF MY REGULAR LEISURE ACTIVITIES WITH OTHERS: USUALLY

## 2025-04-29 SDOH — SOCIAL STABILITY: SOCIAL NETWORK: I HAVE TROUBLE DOING ALL OF THE ACTIVITIES WITH FRIENDS THAT I WANT TO DO: ALWAYS

## 2025-04-29 SDOH — SOCIAL STABILITY: SOCIAL NETWORK: I HAVE TROUBLE DOING ALL OF THE FAMILY ACTIVITIES THAT I WANT TO DO: ALWAYS

## 2025-04-29 ASSESSMENT — ANXIETY QUESTIONNAIRES
PAST MONTH HOW OFTEN HAVE YOU FELT CONFIDENT ABOUT YOUR ABILITY TO HANDLE YOUR PROBLEMS: 2 - SOMETIMES
PAST MONTH HOW OFTEN HAVE YOU FELT DIFFICULTIES WERE PILING UP SO HIGH THAT YOU COULD NOT OVERCOME THEM: 2 - SOMETIMES
PAST MONTH HOW OFTEN HAVE YOU FELT THAT THINGS WERE GOING YOUR WAY: 2 - SOMETIMES
PAST MONTH HOW OFTEN HAVE YOU FELT THAT YOU WERE UNABLE TO CONTROL THE IMPORTANT THINGS IN YOUR LIFE: 2 - SOMETIMES
PAST MONTH HOW OFTEN HAVE YOU FELT THAT YOU WERE UNABLE TO CONTROL THE IMPORTANT THINGS IN YOUR LIFE: 2 - SOMETIMES
PAST MONTH HOW OFTEN HAVE YOU FELT CONFIDENT ABOUT YOUR ABILITY TO HANDLE YOUR PROBLEMS: 2 - SOMETIMES

## 2025-04-29 ASSESSMENT — PROMIS GLOBAL HEALTH SCALE
RATE_QUALITY_OF_LIFE: FAIR
EMOTIONAL_PROBLEMS: SOMETIMES
RATE_AVERAGE_PAIN: 7
RATE_AVERAGE_FATIGUE: SEVERE
CARRYOUT_PHYSICAL_ACTIVITIES: MODERATELY
RATE_MENTAL_HEALTH: FAIR
RATE_PHYSICAL_HEALTH: FAIR
CARRYOUT_SOCIAL_ACTIVITIES: FAIR
RATE_SOCIAL_SATISFACTION: FAIR
RATE_GENERAL_HEALTH: FAIR

## 2025-04-30 ENCOUNTER — APPOINTMENT (OUTPATIENT)
Dept: INTEGRATIVE MEDICINE | Facility: CLINIC | Age: 50
End: 2025-04-30
Payer: COMMERCIAL

## 2025-04-30 VITALS
WEIGHT: 202.1 LBS | BODY MASS INDEX: 34.5 KG/M2 | HEIGHT: 64 IN | DIASTOLIC BLOOD PRESSURE: 93 MMHG | HEART RATE: 94 BPM | SYSTOLIC BLOOD PRESSURE: 136 MMHG | OXYGEN SATURATION: 99 %

## 2025-04-30 DIAGNOSIS — M54.6 CHRONIC THORACIC BACK PAIN, UNSPECIFIED BACK PAIN LATERALITY: ICD-10-CM

## 2025-04-30 DIAGNOSIS — G44.329 CHRONIC POST-TRAUMATIC HEADACHE, NOT INTRACTABLE: ICD-10-CM

## 2025-04-30 DIAGNOSIS — G89.29 CHRONIC THORACIC BACK PAIN, UNSPECIFIED BACK PAIN LATERALITY: ICD-10-CM

## 2025-04-30 DIAGNOSIS — E66.01 MORBID OBESITY (MULTI): Primary | ICD-10-CM

## 2025-04-30 DIAGNOSIS — M99.01 CERVICOTHORACIC SOMATIC DYSFUNCTION: ICD-10-CM

## 2025-04-30 DIAGNOSIS — N92.0 MENORRHAGIA WITH REGULAR CYCLE: ICD-10-CM

## 2025-04-30 DIAGNOSIS — G47.33 OSA (OBSTRUCTIVE SLEEP APNEA): ICD-10-CM

## 2025-04-30 DIAGNOSIS — L65.9 ALOPECIA: ICD-10-CM

## 2025-04-30 PROCEDURE — 99205 OFFICE O/P NEW HI 60 MIN: CPT | Performed by: INTERNAL MEDICINE

## 2025-04-30 ASSESSMENT — ENCOUNTER SYMPTOMS
ABDOMINAL PAIN: 0
DIARRHEA: 1
FATIGUE: 1
HEADACHES: 1
SLEEP DISTURBANCE: 1
NECK PAIN: 1
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
## 2025-05-01 ENCOUNTER — APPOINTMENT (OUTPATIENT)
Dept: PRIMARY CARE | Facility: CLINIC | Age: 50
End: 2025-05-01
Payer: COMMERCIAL

## 2025-05-03 PROCEDURE — RXMED WILLOW AMBULATORY MEDICATION CHARGE

## 2025-05-04 PROCEDURE — RXMED WILLOW AMBULATORY MEDICATION CHARGE

## 2025-05-05 ENCOUNTER — APPOINTMENT (OUTPATIENT)
Dept: INTEGRATIVE MEDICINE | Facility: CLINIC | Age: 50
End: 2025-05-05
Payer: COMMERCIAL

## 2025-05-05 DIAGNOSIS — M25.561 CHRONIC PAIN OF BOTH KNEES: Primary | ICD-10-CM

## 2025-05-05 DIAGNOSIS — M25.562 CHRONIC PAIN OF BOTH KNEES: Primary | ICD-10-CM

## 2025-05-05 DIAGNOSIS — M54.2 NECK PAIN: ICD-10-CM

## 2025-05-05 DIAGNOSIS — G89.29 CHRONIC PAIN OF BOTH KNEES: Primary | ICD-10-CM

## 2025-05-05 DIAGNOSIS — K76.9 LIVER LESION: Primary | ICD-10-CM

## 2025-05-05 PROCEDURE — ACUGR GROUP ACUPUNCTURE: Performed by: ACUPUNCTURIST

## 2025-05-05 NOTE — PROGRESS NOTES
Acupuncture Visit:     Subjective   Patient ID: Theodora Montez is a 49 y.o. female who presents for No chief complaint on file.  Seeking continued support for bilateral knee pain and neck pain                  Review of Systems         Provider reviewed plan for the acupuncture session, precautions and contraindications. Patient/guardian/hospital staff has given consent to treat with full understanding of what to expect during the session. Before acupuncture began, provider explained to the patient to communicate at any time if the procedure was causing discomfort past their tolerance level. Patient agreed to advise acupuncturist. The acupuncturist counseled the patient on the risks of acupuncture treatment including pain, infection, bleeding, and no relief of pain. The patient was positioned comfortably. There was no evidence of infection at the site of needle insertions.    Objective   Physical Exam    Acupuncture Physical Exam  Tongue Color: Dusky    Treatment Plan  Treatment Goals: Pain management    Acupuncture Treatment  Patient Position: Seated and reclining  Needle Guage: 42 guage /.14/ Lime green seirin, 36 guage /.20/ Blue seirin  Body Points - Bilateral: sp 3.2, 9, gb 34, sp 10, heding, st 35, xiyan gb 21  Needle Count In: 16  Needle Count Out: 16  Needle Retention Time (min): 25 minutes  Total Face to Face Time (min): 25 minutes              Assessment/Plan

## 2025-05-08 ENCOUNTER — PHARMACY VISIT (OUTPATIENT)
Dept: PHARMACY | Facility: CLINIC | Age: 50
End: 2025-05-08
Payer: COMMERCIAL

## 2025-05-19 ENCOUNTER — APPOINTMENT (OUTPATIENT)
Dept: NEPHROLOGY | Facility: CLINIC | Age: 50
End: 2025-05-19
Payer: COMMERCIAL

## 2025-05-19 VITALS
SYSTOLIC BLOOD PRESSURE: 120 MMHG | BODY MASS INDEX: 35.51 KG/M2 | HEIGHT: 64 IN | WEIGHT: 208 LBS | HEART RATE: 80 BPM | DIASTOLIC BLOOD PRESSURE: 89 MMHG

## 2025-05-19 DIAGNOSIS — E78.5 HYPERLIPIDEMIA, UNSPECIFIED HYPERLIPIDEMIA TYPE: ICD-10-CM

## 2025-05-19 DIAGNOSIS — N17.9 AKI (ACUTE KIDNEY INJURY): Primary | ICD-10-CM

## 2025-05-19 DIAGNOSIS — I10 PRIMARY HYPERTENSION: ICD-10-CM

## 2025-05-19 PROCEDURE — 3074F SYST BP LT 130 MM HG: CPT | Performed by: INTERNAL MEDICINE

## 2025-05-19 PROCEDURE — 99212 OFFICE O/P EST SF 10 MIN: CPT | Performed by: INTERNAL MEDICINE

## 2025-05-19 PROCEDURE — 1036F TOBACCO NON-USER: CPT | Performed by: INTERNAL MEDICINE

## 2025-05-19 PROCEDURE — 3008F BODY MASS INDEX DOCD: CPT | Performed by: INTERNAL MEDICINE

## 2025-05-19 PROCEDURE — 3079F DIAST BP 80-89 MM HG: CPT | Performed by: INTERNAL MEDICINE

## 2025-05-19 RX ORDER — BLOOD PRESSURE TEST KIT-WRIST
100 KIT MISCELLANEOUS DAILY
COMMUNITY

## 2025-05-19 RX ORDER — AMLODIPINE BESYLATE 2.5 MG/1
2.5 TABLET ORAL DAILY
Qty: 90 TABLET | Refills: 0 | Status: SHIPPED | OUTPATIENT
Start: 2025-05-19

## 2025-05-19 RX ORDER — ATORVASTATIN CALCIUM 10 MG/1
10 TABLET, FILM COATED ORAL DAILY
Qty: 90 TABLET | Refills: 0 | Status: SHIPPED | OUTPATIENT
Start: 2025-05-19

## 2025-05-19 ASSESSMENT — PATIENT HEALTH QUESTIONNAIRE - PHQ9
2. FEELING DOWN, DEPRESSED OR HOPELESS: SEVERAL DAYS
SUM OF ALL RESPONSES TO PHQ9 QUESTIONS 1 AND 2: 2
1. LITTLE INTEREST OR PLEASURE IN DOING THINGS: SEVERAL DAYS
10. IF YOU CHECKED OFF ANY PROBLEMS, HOW DIFFICULT HAVE THESE PROBLEMS MADE IT FOR YOU TO DO YOUR WORK, TAKE CARE OF THINGS AT HOME, OR GET ALONG WITH OTHER PEOPLE: SOMEWHAT DIFFICULT

## 2025-05-19 ASSESSMENT — PAIN SCALES - GENERAL: PAINLEVEL_OUTOF10: 5

## 2025-05-19 NOTE — PROGRESS NOTES
Chief Complaint: Follow up CKD    No specific complaints  Denies nausea, vomiting, chest pain, dyspnea  No urinary symptoms    NAD  Sclera AI s inj  MMM, no sores  Deferred secondary to COVID  No edema  No tremor  No rash    AMY resolved  HTN at goal  Proteinuria none significant prior    Labs in June to verify recovery  Follow up PRN

## 2025-05-27 ENCOUNTER — APPOINTMENT (OUTPATIENT)
Dept: PRIMARY CARE | Facility: CLINIC | Age: 50
End: 2025-05-27
Payer: COMMERCIAL

## 2025-05-30 ENCOUNTER — APPOINTMENT (OUTPATIENT)
Dept: GASTROENTEROLOGY | Facility: CLINIC | Age: 50
End: 2025-05-30
Payer: COMMERCIAL

## 2025-06-02 ENCOUNTER — APPOINTMENT (OUTPATIENT)
Dept: INTEGRATIVE MEDICINE | Facility: CLINIC | Age: 50
End: 2025-06-02
Payer: COMMERCIAL

## 2025-06-02 DIAGNOSIS — G89.29 CHRONIC LEFT SHOULDER PAIN: Primary | ICD-10-CM

## 2025-06-02 DIAGNOSIS — M25.512 CHRONIC LEFT SHOULDER PAIN: Primary | ICD-10-CM

## 2025-06-02 DIAGNOSIS — M25.561 CHRONIC PAIN OF BOTH KNEES: ICD-10-CM

## 2025-06-02 DIAGNOSIS — G89.29 OTHER CHRONIC PAIN: ICD-10-CM

## 2025-06-02 DIAGNOSIS — G89.29 CHRONIC PAIN OF BOTH KNEES: ICD-10-CM

## 2025-06-02 DIAGNOSIS — M25.562 CHRONIC PAIN OF BOTH KNEES: ICD-10-CM

## 2025-06-02 PROBLEM — F32.A DEPRESSION: Status: RESOLVED | Noted: 2024-02-19 | Resolved: 2025-06-02

## 2025-06-02 PROBLEM — N95.1 HOT FLASH, MENOPAUSAL: Status: RESOLVED | Noted: 2024-02-19 | Resolved: 2025-06-02

## 2025-06-02 PROBLEM — E66.812 OBESITY, CLASS II, BMI 35-39.9: Status: ACTIVE | Noted: 2024-02-19

## 2025-06-02 PROBLEM — G47.33 OSA (OBSTRUCTIVE SLEEP APNEA): Status: RESOLVED | Noted: 2024-02-19 | Resolved: 2025-06-02

## 2025-06-02 PROBLEM — N83.209 OVARIAN CYST: Status: ACTIVE | Noted: 2025-03-20

## 2025-06-02 PROCEDURE — RXMED WILLOW AMBULATORY MEDICATION CHARGE

## 2025-06-02 PROCEDURE — ACUGR GROUP ACUPUNCTURE: Performed by: ACUPUNCTURIST

## 2025-06-02 NOTE — PROGRESS NOTES
Acupuncture Visit:     Subjective   Patient ID: Theodora Montez is a 49 y.o. female who presents for No chief complaint on file.  Seeking ongoing support for shoulder and knee pain.  She has some difficulty with ROM rotation to left with head.  Left sided knee tends toward swelling but both are in need of support for chronic pain.          Session Information  Is this acupuncture treatment being billed to the patient's insurance company: No  Visit Type: Follow-up visit         Review of Systems         Provider reviewed plan for the acupuncture session, precautions and contraindications. Patient/guardian/hospital staff has given consent to treat with full understanding of what to expect during the session. Before acupuncture began, provider explained to the patient to communicate at any time if the procedure was causing discomfort past their tolerance level. Patient agreed to advise acupuncturist. The acupuncturist counseled the patient on the risks of acupuncture treatment including pain, infection, bleeding, and no relief of pain. The patient was positioned comfortably. There was no evidence of infection at the site of needle insertions.    Objective   Physical Exam         Treatment Plan  Treatment Goals: Pain management    Acupuncture Treatment  Patient Position: Seated and reclining  Needle Guage: 40 guage /.16/ Red seirin, 36 guage /.20/ Blue seirin  Body Points - Bilateral: sp 9, lr 8, gb 34, st 36, 44, heding xiyan, st 35 yin linares  Body Points - Right: gb21, 20  Needle Count In: 19  Needle Count Out: 19  Needle Retention Time (min): 25 minutes  Total Face to Face Time (min): 25 minutes              Assessment/Plan

## 2025-06-04 ENCOUNTER — PHARMACY VISIT (OUTPATIENT)
Dept: PHARMACY | Facility: CLINIC | Age: 50
End: 2025-06-04
Payer: COMMERCIAL

## 2025-06-07 DIAGNOSIS — R20.2 PARESTHESIAS: ICD-10-CM

## 2025-06-09 ENCOUNTER — APPOINTMENT (OUTPATIENT)
Dept: INTEGRATIVE MEDICINE | Facility: CLINIC | Age: 50
End: 2025-06-09

## 2025-06-09 ENCOUNTER — APPOINTMENT (OUTPATIENT)
Dept: INTEGRATIVE MEDICINE | Facility: CLINIC | Age: 50
End: 2025-06-09
Payer: COMMERCIAL

## 2025-06-09 ENCOUNTER — HOSPITAL ENCOUNTER (OUTPATIENT)
Dept: RADIOLOGY | Facility: HOSPITAL | Age: 50
Discharge: HOME | End: 2025-06-09
Payer: COMMERCIAL

## 2025-06-09 DIAGNOSIS — D36.9 DERMOID CYST: ICD-10-CM

## 2025-06-09 DIAGNOSIS — N18.30 STAGE 3 CHRONIC KIDNEY DISEASE, UNSPECIFIED WHETHER STAGE 3A OR 3B CKD (MULTI): ICD-10-CM

## 2025-06-09 PROCEDURE — 76830 TRANSVAGINAL US NON-OB: CPT | Performed by: STUDENT IN AN ORGANIZED HEALTH CARE EDUCATION/TRAINING PROGRAM

## 2025-06-09 PROCEDURE — 76856 US EXAM PELVIC COMPLETE: CPT

## 2025-06-09 PROCEDURE — 76856 US EXAM PELVIC COMPLETE: CPT | Performed by: STUDENT IN AN ORGANIZED HEALTH CARE EDUCATION/TRAINING PROGRAM

## 2025-06-09 RX ORDER — METFORMIN HYDROCHLORIDE 500 MG/1
1000 TABLET, EXTENDED RELEASE ORAL
Qty: 360 TABLET | Refills: 1 | Status: SHIPPED | OUTPATIENT
Start: 2025-06-09 | End: 2026-07-14

## 2025-06-09 RX ORDER — METFORMIN HYDROCHLORIDE 500 MG/1
1000 TABLET, EXTENDED RELEASE ORAL
Qty: 360 TABLET | Refills: 1 | Status: SHIPPED | OUTPATIENT
Start: 2025-06-09 | End: 2025-06-09 | Stop reason: SDUPTHER

## 2025-06-10 ENCOUNTER — APPOINTMENT (OUTPATIENT)
Dept: OBSTETRICS AND GYNECOLOGY | Facility: CLINIC | Age: 50
End: 2025-06-10
Payer: COMMERCIAL
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
## 2025-06-11 ENCOUNTER — HOSPITAL ENCOUNTER (OUTPATIENT)
Dept: GASTROENTEROLOGY | Facility: HOSPITAL | Age: 50
Discharge: HOME | End: 2025-06-11
Payer: COMMERCIAL

## 2025-06-11 ENCOUNTER — ANESTHESIA (OUTPATIENT)
Dept: GASTROENTEROLOGY | Facility: HOSPITAL | Age: 50
End: 2025-06-11
Payer: COMMERCIAL

## 2025-06-11 ENCOUNTER — ANESTHESIA EVENT (OUTPATIENT)
Dept: GASTROENTEROLOGY | Facility: HOSPITAL | Age: 50
End: 2025-06-11
Payer: COMMERCIAL

## 2025-06-11 VITALS
BODY MASS INDEX: 36.72 KG/M2 | HEART RATE: 75 BPM | TEMPERATURE: 97.9 F | HEIGHT: 63 IN | RESPIRATION RATE: 16 BRPM | SYSTOLIC BLOOD PRESSURE: 158 MMHG | DIASTOLIC BLOOD PRESSURE: 107 MMHG | WEIGHT: 207.23 LBS | OXYGEN SATURATION: 100 %

## 2025-06-11 DIAGNOSIS — Z12.11 COLON CANCER SCREENING: ICD-10-CM

## 2025-06-11 DIAGNOSIS — K44.9 HIATAL HERNIA: ICD-10-CM

## 2025-06-11 DIAGNOSIS — R93.5 ABNORMAL CT OF THE ABDOMEN: ICD-10-CM

## 2025-06-11 PROCEDURE — 2500000004 HC RX 250 GENERAL PHARMACY W/ HCPCS (ALT 636 FOR OP/ED): Performed by: NURSE ANESTHETIST, CERTIFIED REGISTERED

## 2025-06-11 PROCEDURE — A45390 PR COLONOSCOPY FLX W/ENDOSCOPIC MUCOSAL RESECTION: Performed by: NURSE ANESTHETIST, CERTIFIED REGISTERED

## 2025-06-11 PROCEDURE — 45385 COLONOSCOPY W/LESION REMOVAL: CPT | Performed by: INTERNAL MEDICINE

## 2025-06-11 PROCEDURE — 3700000001 HC GENERAL ANESTHESIA TIME - INITIAL BASE CHARGE

## 2025-06-11 PROCEDURE — 3700000002 HC GENERAL ANESTHESIA TIME - EACH INCREMENTAL 1 MINUTE

## 2025-06-11 PROCEDURE — 7100000009 HC PHASE TWO TIME - INITIAL BASE CHARGE

## 2025-06-11 PROCEDURE — 43239 EGD BIOPSY SINGLE/MULTIPLE: CPT | Performed by: INTERNAL MEDICINE

## 2025-06-11 PROCEDURE — 7100000010 HC PHASE TWO TIME - EACH INCREMENTAL 1 MINUTE

## 2025-06-11 PROCEDURE — 45390 COLONOSCOPY W/RESECTION: CPT | Performed by: INTERNAL MEDICINE

## 2025-06-11 PROCEDURE — A45390 PR COLONOSCOPY FLX W/ENDOSCOPIC MUCOSAL RESECTION: Performed by: ANESTHESIOLOGY

## 2025-06-11 PROCEDURE — 2720000007 HC OR 272 NO HCPCS

## 2025-06-11 RX ORDER — LIDOCAINE HYDROCHLORIDE 20 MG/ML
INJECTION, SOLUTION INFILTRATION; PERINEURAL AS NEEDED
Status: DISCONTINUED | OUTPATIENT
Start: 2025-06-11 | End: 2025-06-11

## 2025-06-11 RX ORDER — MIDAZOLAM HYDROCHLORIDE 1 MG/ML
INJECTION INTRAMUSCULAR; INTRAVENOUS AS NEEDED
Status: DISCONTINUED | OUTPATIENT
Start: 2025-06-11 | End: 2025-06-11

## 2025-06-11 RX ORDER — PROPOFOL 10 MG/ML
INJECTION, EMULSION INTRAVENOUS AS NEEDED
Status: DISCONTINUED | OUTPATIENT
Start: 2025-06-11 | End: 2025-06-11

## 2025-06-11 RX ORDER — ONDANSETRON HYDROCHLORIDE 2 MG/ML
INJECTION, SOLUTION INTRAVENOUS AS NEEDED
Status: DISCONTINUED | OUTPATIENT
Start: 2025-06-11 | End: 2025-06-11

## 2025-06-11 RX ORDER — ACETAMINOPHEN 500 MG
1000 TABLET ORAL AS NEEDED
COMMUNITY

## 2025-06-11 RX ADMIN — MIDAZOLAM HYDROCHLORIDE 2 MG: 1 INJECTION, SOLUTION INTRAMUSCULAR; INTRAVENOUS at 09:26

## 2025-06-11 RX ADMIN — LIDOCAINE HYDROCHLORIDE 2.5 ML: 20 INJECTION, SOLUTION INFILTRATION; PERINEURAL at 09:31

## 2025-06-11 RX ADMIN — PROPOFOL 100 MG: 10 INJECTION, EMULSION INTRAVENOUS at 09:31

## 2025-06-11 RX ADMIN — PROPOFOL 150 MCG/KG/MIN: 10 INJECTION, EMULSION INTRAVENOUS at 09:32

## 2025-06-11 RX ADMIN — ONDANSETRON 4 MG: 2 INJECTION, SOLUTION INTRAMUSCULAR; INTRAVENOUS at 09:31

## 2025-06-11 ASSESSMENT — PAIN SCALES - GENERAL
PAINLEVEL_OUTOF10: 0 - NO PAIN
PAINLEVEL_OUTOF10: 4
PAINLEVEL_OUTOF10: 0 - NO PAIN
PAINLEVEL_OUTOF10: 0 - NO PAIN

## 2025-06-11 ASSESSMENT — PAIN - FUNCTIONAL ASSESSMENT
PAIN_FUNCTIONAL_ASSESSMENT: 0-10

## 2025-06-11 ASSESSMENT — PAIN DESCRIPTION - DESCRIPTORS: DESCRIPTORS: ACHING

## 2025-06-11 NOTE — DISCHARGE INSTRUCTIONS

## 2025-06-11 NOTE — ANESTHESIA POSTPROCEDURE EVALUATION
Patient: Theodora Montez    Procedure Summary       Date: 06/11/25 Room / Location: [facility]    Anesthesia Start: 0926 Anesthesia Stop: 1023    Procedures:       COLONOSCOPY      EGD Diagnosis:       Colon cancer screening      Hiatal hernia      Abnormal CT of the abdomen    Scheduled Providers: David Fung MD; Demarco East MD; JASON Feliciano-CRNA Responsible Provider: Demarco East MD    Anesthesia Type: MAC ASA Status: 2            Anesthesia Type: MAC    Vitals Value Taken Time   /107 06/11/25 10:56   Temp 36.6 °C (97.9 °F) 06/11/25 10:23   Pulse 73 06/11/25 11:01   Resp 16 06/11/25 10:53   SpO2 100 % 06/11/25 11:01   Vitals shown include unfiled device data.    Anesthesia Post Evaluation    Patient location during evaluation: PACU  Patient participation: complete - patient participated  Level of consciousness: awake and alert  Pain management: adequate  Airway patency: patent  Cardiovascular status: acceptable and hemodynamically stable  Respiratory status: acceptable, spontaneous ventilation and nonlabored ventilation  Hydration status: acceptable  Postoperative Nausea and Vomiting: none        There were no known notable events for this encounter.

## 2025-06-11 NOTE — ANESTHESIA PREPROCEDURE EVALUATION
Patient: Theodora Montez    Procedure Information       Date/Time: 06/11/25 0910    Scheduled providers: David Fung MD; Demarco East MD; JASON Feliciano-ODALIS    Procedures:       COLONOSCOPY      EGD    Location: [facility]            Relevant Problems   Cardiac   (+) Hyperlipidemia   (+) Hypertension      Neuro   (+) Neurogenic thoracic outlet syndrome      Endocrine   (+) Morbid obesity (Multi)      Musculoskeletal   (+) Chronic pain syndrome   (+) Fibromyalgia      HEENT   (+) Seasonal allergies       Clinical information reviewed:   Tobacco  Allergies  Meds   Med Hx  Surg Hx  OB Status  Fam Hx  Soc   Hx         Medical History[1]   Surgical History[2]  Social History[3]   Current Outpatient Medications   Medication Instructions    acetaminophen (TYLENOL) 1,000 mg, As needed    albuterol 90 mcg/actuation inhaler 2 puffs, inhalation, Every 6 hours PRN    alpha lipoic acid 200 mg, Daily RT    amitriptyline (ELAVIL) 10 mg, oral, Nightly    amLODIPine (NORVASC) 2.5 mg, oral, Daily    wwmr-khzv-Hycf-banab-mar-thean [id number] mg capsule 1 capsule, Nightly    atorvastatin (LIPITOR) 10 mg, oral, Daily    b complex vitamins capsule 1 capsule, oral, 2 times daily    buPROPion XL (WELLBUTRIN XL) 150 mg, oral, Every morning    buPROPion XL (WELLBUTRIN XL) 300 mg, oral, Daily, Do not crush, chew, or split.    cholecalciferol, vitamin D3, (VITAMIN D3 ORAL) Take by mouth.    COQ10, UBIQUINOL, ORAL Take by mouth.    Farxiga 10 mg, oral, Daily, TAKE ONE TABLET BY MOUTH ONCE DAILY IN THE MORNING    gabapentin (NEURONTIN) 200 mg, oral, 3 times daily    ginseng 200 mg, Daily RT    magnesium glycinate 100 mg, Daily    metFORMIN XR (GLUCOPHAGE-XR) 1,000 mg, oral, 2 times daily (morning and late afternoon), Do not crush, chew, or split.    milnacipran (SAVELLA) 100 mg, oral, 2 times daily    semaglutide (OZEMPIC) 1 mg, subcutaneous, Weekly    topiramate (TOPAMAX) 100 mg, oral, Daily     "  RX Allergies[4]     Chemistry    Lab Results   Component Value Date/Time     03/26/2025 1309    K 4.0 03/26/2025 1309     03/26/2025 1309    CO2 22 03/26/2025 1309    BUN 11 03/26/2025 1309    CREATININE 0.96 03/26/2025 1309    Lab Results   Component Value Date/Time    CALCIUM 9.3 03/26/2025 1309    ALKPHOS 80 03/20/2025 1824    AST 23 03/20/2025 1824    ALT 53 (H) 03/20/2025 1824    BILITOT 0.5 03/20/2025 1824          Lab Results   Component Value Date    HGBA1C 4.9 03/26/2025     Lab Results   Component Value Date/Time    WBC 9.2 03/26/2025 1309    HGB 15.1 03/26/2025 1309    HCT 45.3 (H) 03/26/2025 1309     (H) 03/26/2025 1309     No results found for: \"PROTIME\", \"PTT\", \"INR\"  No results found for this or any previous visit (from the past 4464 hours).   No results found for this or any previous visit from the past 1095 days.        Visit Vitals  BP (!) 154/96   Pulse 84   Temp 36.7 °C (98.1 °F) (Temporal)   Resp 16   Ht 1.6 m (5' 3\")   Wt 94 kg (207 lb 3.7 oz)   LMP 05/17/2025 (Exact Date)   SpO2 99%   BMI 36.71 kg/m²   OB Status Having periods   Smoking Status Never   BSA 2.04 m²     NPO/Void Status  Carbohydrate Drink Given Prior to Surgery? : N  Date of Last Liquid: 06/11/25  Time of Last Liquid: 0430  Date of Last Solid: 06/10/25  Time of Last Solid: 0000  Last Intake Type: Clear fluids  Time of Last Void: 0830        Physical Exam    Airway  Mallampati: II  TM distance: >3 FB  Neck ROM: full  Mouth opening: 3 or more finger widths     Cardiovascular - normal exam  Rhythm: regular  Rate: normal     Dental    Pulmonary - normal exam   Abdominal - normal exam           Anesthesia Plan    History of general anesthesia?: yes  History of complications of general anesthesia?: no    ASA 2     MAC   (Standard ASA monitoring.)  intravenous induction   Anesthetic plan and risks discussed with patient.    Plan discussed with CRNA and CAA.             [1]   Past Medical History:  Diagnosis Date    " Acquired spondylolisthesis 02/19/2024    Anxiety and depression     Arthritis     Asthma     CKD (chronic kidney disease)     Essential hypertension, benign 09/06/2007    Fibromyalgia     History of blood disorder 02/19/2024    Migraines     PONV (postoperative nausea and vomiting) 5/85    Recurrent major depression in remission 11/11/2021    Sleep apnea 10/2007   [2]   Past Surgical History:  Procedure Laterality Date    TONSILLECTOMY  03/08/1989    WISDOM TOOTH EXTRACTION  [id number]   [3]   Social History  Tobacco Use    Smoking status: Never     Passive exposure: Yes    Smokeless tobacco: Never    Tobacco comments:     2nd hand smoke-    Substance Use Topics    Alcohol use: Yes     Alcohol/week: 1.0 standard drink of alcohol     Types: 1 Glasses of wine per week     Comment: More like once a month    Drug use: Never   [4]   Allergies  Allergen Reactions    Celecoxib Swelling    Sulfa (Sulfonamide Antibiotics) Swelling     pain

## 2025-06-13 ENCOUNTER — APPOINTMENT (OUTPATIENT)
Dept: OBSTETRICS AND GYNECOLOGY | Facility: CLINIC | Age: 50
End: 2025-06-13
Payer: COMMERCIAL

## 2025-06-13 ENCOUNTER — CLINICAL SUPPORT (OUTPATIENT)
Dept: SLEEP MEDICINE | Facility: CLINIC | Age: 50
End: 2025-06-13
Payer: COMMERCIAL

## 2025-06-13 VITALS — SYSTOLIC BLOOD PRESSURE: 122 MMHG | WEIGHT: 210 LBS | BODY MASS INDEX: 37.2 KG/M2 | DIASTOLIC BLOOD PRESSURE: 90 MMHG

## 2025-06-13 DIAGNOSIS — N83.201 HEMORRHAGIC CYST OF RIGHT OVARY: Primary | ICD-10-CM

## 2025-06-13 DIAGNOSIS — E66.01 MORBID OBESITY (MULTI): ICD-10-CM

## 2025-06-13 DIAGNOSIS — G47.33 OSA (OBSTRUCTIVE SLEEP APNEA): ICD-10-CM

## 2025-06-13 DIAGNOSIS — D36.9 DERMOID CYST: ICD-10-CM

## 2025-06-13 DIAGNOSIS — D27.0 DERMOID CYST OF RIGHT OVARY: ICD-10-CM

## 2025-06-13 PROCEDURE — 3080F DIAST BP >= 90 MM HG: CPT | Performed by: OBSTETRICS & GYNECOLOGY

## 2025-06-13 PROCEDURE — 3074F SYST BP LT 130 MM HG: CPT | Performed by: OBSTETRICS & GYNECOLOGY

## 2025-06-13 PROCEDURE — 99213 OFFICE O/P EST LOW 20 MIN: CPT | Performed by: OBSTETRICS & GYNECOLOGY

## 2025-06-13 ASSESSMENT — PATIENT HEALTH QUESTIONNAIRE - PHQ9
2. FEELING DOWN, DEPRESSED OR HOPELESS: NOT AT ALL
1. LITTLE INTEREST OR PLEASURE IN DOING THINGS: NOT AT ALL
SUM OF ALL RESPONSES TO PHQ9 QUESTIONS 1 AND 2: 0

## 2025-06-13 ASSESSMENT — PAIN SCALES - GENERAL: PAINLEVEL_OUTOF10: 4

## 2025-06-13 NOTE — PROGRESS NOTES
Assessment     PLAN  1. Hemorrhagic cyst of right ovary (Primary)  - reviewed ultrasound report with patient and partner showing a small hemorrhagic cyst as well as 2 small dermoid cysts on the right ovary. Discussed that hemorrhagic cysts are physiologic and typically resolve without intervention. Discussed that dermoid cysts are less likely to resolve. The dermoid cyst size is unchanged. There was an additional small 1cm dermoid noted as well. We reviewed options for monitoring vs surgical removal. Given small size we plan to monitor with repeat ultrasound in 6 months    2. Dermoid cyst of right ovary  - see above  - US PELVIS TRANSABDOMINAL WITH TRANSVAGINAL; Future    Follow up for annual exam in 2026 or sooner NANDO Laswon MD      Subjective     Theodora Montez is a 49 y.o. female who is here for ER follow up   PCP = Tha Cheng DO    - patient continues to have a change in menses after an accident a few months ago. Her menses are now about 6 days long and much heavier.     PMH, PSH, FH, SH, medications and allergies reviewed and edited as needed.      Objective   /90   Wt 95.3 kg (210 lb)   LMP 06/10/2025 (Exact Date)   BMI 37.20 kg/m²      General:   Alert and oriented, in no acute distress

## 2025-06-14 NOTE — PROGRESS NOTES
Type of Study: HOME SLEEP STUDY - NOMAD     The patient received equipment and instructions for use of the [facility] Nomad HSAT device. The patient was instructed how to apply the effort belts, cannula, thermistor. It was also explained how the Nomad and oximeter components work.  The patient was asked to record their sleep for an 8-hour period.     The patient was informed of their responsibility for the device and acknowledged this by signing the HSAT device contract. The patient was asked to return the device on 6/14/2025 between the hours of 7:00-3:00 to the Sleep Center.     The patient was instructed to call 911 as usual for any medical- emergencies while at home.  The patient was also given a phone number for troubleshooting when using the device in case there were additional questions.

## 2025-06-17 RX ORDER — GABAPENTIN 100 MG/1
200 CAPSULE ORAL 3 TIMES DAILY
Qty: 270 CAPSULE | Refills: 0 | Status: SHIPPED | OUTPATIENT
Start: 2025-06-17

## 2025-06-20 DIAGNOSIS — N17.9 AKI (ACUTE KIDNEY INJURY): ICD-10-CM

## 2025-06-23 ENCOUNTER — CLINICAL SUPPORT (OUTPATIENT)
Dept: SLEEP MEDICINE | Facility: CLINIC | Age: 50
End: 2025-06-23
Payer: COMMERCIAL

## 2025-06-23 DIAGNOSIS — B37.81 CANDIDA ESOPHAGITIS (MULTI): Primary | ICD-10-CM

## 2025-06-23 DIAGNOSIS — F32.A DEPRESSION, UNSPECIFIED DEPRESSION TYPE: ICD-10-CM

## 2025-06-23 DIAGNOSIS — G47.33 OBSTRUCTIVE SLEEP APNEA (ADULT) (PEDIATRIC): ICD-10-CM

## 2025-06-23 LAB
LABORATORY COMMENT REPORT: NORMAL
PATH REPORT.FINAL DX SPEC: NORMAL
PATH REPORT.GROSS SPEC: NORMAL
PATH REPORT.TOTAL CANCER: NORMAL

## 2025-06-23 RX ORDER — BUPROPION HYDROCHLORIDE 300 MG/1
300 TABLET ORAL DAILY
Qty: 30 TABLET | Refills: 0 | Status: SHIPPED | OUTPATIENT
Start: 2025-06-23

## 2025-06-23 RX ORDER — BUPROPION HYDROCHLORIDE 150 MG/1
150 TABLET ORAL
Qty: 30 TABLET | Refills: 0 | Status: SHIPPED | OUTPATIENT
Start: 2025-06-23

## 2025-06-23 RX ORDER — FLUCONAZOLE 100 MG/1
TABLET ORAL
Qty: 15 TABLET | Refills: 0 | Status: SHIPPED | OUTPATIENT
Start: 2025-06-23 | End: 2025-07-14

## 2025-06-23 NOTE — PROGRESS NOTES
Type of Study: HOME SLEEP STUDY - NOMAD     The patient received equipment and instructions for use of the [facility] Nomad HSAT device. The patient was instructed how to apply the effort belts, cannula, thermistor. It was also explained how the Nomad and oximeter components work.  The patient was asked to record their sleep for an 8-hour period.     The patient was informed of their responsibility for the device and acknowledged this by signing the HSAT device contract. The patient was asked to return the device on 06/24/2025 between the hours of 06:30 am - 6:30 pm to the Sleep Center.     The patient was instructed to call 911 as usual for any medical- emergencies while at home.  The patient was also given a phone number for troubleshooting when using the device in case there were additional questions.

## 2025-06-24 ENCOUNTER — TELEPHONE (OUTPATIENT)
Dept: PRIMARY CARE | Facility: CLINIC | Age: 50
End: 2025-06-24
Payer: COMMERCIAL

## 2025-06-24 NOTE — TELEPHONE ENCOUNTER
Good afternoon.  I received a call from [facility].  The pharmacist would like a call back regarding tranexamic acid that was prescribed.  Wants to verify the amount and length of time of rx due to patient kidney function.  The pharmacist, Mahamed, can be reached at [phone].   Thank you.

## 2025-06-25 ENCOUNTER — APPOINTMENT (OUTPATIENT)
Dept: PRIMARY CARE | Facility: CLINIC | Age: 50
End: 2025-06-25
Payer: COMMERCIAL

## 2025-06-30 ENCOUNTER — APPOINTMENT (OUTPATIENT)
Dept: NEPHROLOGY | Facility: CLINIC | Age: 50
End: 2025-06-30
Payer: COMMERCIAL

## 2025-06-30 PROCEDURE — RXMED WILLOW AMBULATORY MEDICATION CHARGE

## 2025-07-02 ENCOUNTER — PHARMACY VISIT (OUTPATIENT)
Dept: PHARMACY | Facility: CLINIC | Age: 50
End: 2025-07-02
Payer: COMMERCIAL

## 2025-07-09 ENCOUNTER — APPOINTMENT (OUTPATIENT)
Facility: CLINIC | Age: 50
End: 2025-07-09
Payer: COMMERCIAL

## 2025-07-09 ENCOUNTER — APPOINTMENT (OUTPATIENT)
Dept: INTEGRATIVE MEDICINE | Facility: CLINIC | Age: 50
End: 2025-07-09

## 2025-07-15 ENCOUNTER — OFFICE VISIT (OUTPATIENT)
Dept: SLEEP MEDICINE | Facility: HOSPITAL | Age: 50
End: 2025-07-15
Payer: COMMERCIAL

## 2025-07-15 VITALS
OXYGEN SATURATION: 96 % | WEIGHT: 218 LBS | DIASTOLIC BLOOD PRESSURE: 76 MMHG | HEART RATE: 95 BPM | SYSTOLIC BLOOD PRESSURE: 109 MMHG | TEMPERATURE: 97.3 F | BODY MASS INDEX: 38.62 KG/M2

## 2025-07-15 DIAGNOSIS — F51.04 CHRONIC INSOMNIA: ICD-10-CM

## 2025-07-15 DIAGNOSIS — G47.33 OSA (OBSTRUCTIVE SLEEP APNEA): Primary | ICD-10-CM

## 2025-07-15 DIAGNOSIS — E66.09 CLASS 2 OBESITY DUE TO EXCESS CALORIES WITHOUT SERIOUS COMORBIDITY WITH BODY MASS INDEX (BMI) OF 38.0 TO 38.9 IN ADULT: ICD-10-CM

## 2025-07-15 DIAGNOSIS — E66.812 CLASS 2 OBESITY DUE TO EXCESS CALORIES WITHOUT SERIOUS COMORBIDITY WITH BODY MASS INDEX (BMI) OF 38.0 TO 38.9 IN ADULT: ICD-10-CM

## 2025-07-15 RX ORDER — CYCLOBENZAPRINE HCL 5 MG
10 TABLET ORAL
COMMUNITY
Start: 2025-06-07

## 2025-07-15 ASSESSMENT — LIFESTYLE VARIABLES
HOW OFTEN DO YOU HAVE SIX OR MORE DRINKS ON ONE OCCASION: NEVER
HOW OFTEN DO YOU HAVE A DRINK CONTAINING ALCOHOL: NEVER
SKIP TO QUESTIONS 9-10: 1
HOW MANY STANDARD DRINKS CONTAINING ALCOHOL DO YOU HAVE ON A TYPICAL DAY: PATIENT DOES NOT DRINK
AUDIT-C TOTAL SCORE: 0

## 2025-07-15 ASSESSMENT — PAIN SCALES - GENERAL: PAINLEVEL_OUTOF10: 0-NO PAIN

## 2025-07-15 NOTE — PATIENT INSTRUCTIONS
[facility] Sleep Medicine  [facility] [place]  [address]  [facility] [address]  [phone]    [facility] [place]  [address]  [facility] [address]  [phone]  [facility] [place]  [address]  [facility] [address]LO[address]  [facility] [address]           NAME: Theodora Montez   DATE: 7/15/2025     Your Sleep Provider Today: Ely Alejandra MD  Your Primary Care Physician: Tha Cheng, DO   Your Referring Provider: No ref. provider found    Thank you for coming to the Sleep Medicine Clinic today! Your sleep medicine provider today was: Ely Alejandra MD Below is a summary of your treatment plan, other important information, and our contact numbers:  If you need to schedule an appointment, please call [phone]  If you need general assistance (e.g. forms completed, general questions, or additional help scheduling), please call my , Nelly, at [phone].  If you have a medical question about your sleep issues, please contact our nurses, Rose or Krissy at [phone].   You can also contact us through [facility].      DIAGNOSIS:   1. KEVIN (obstructive sleep apnea)  Positive Airway Pressure (PAP) Therapy            TREATMENT PLAN       - keep a sleep diary for 2 weeks and send in by [employer]  - we will order cpap for you      Follow-up Appointment:   Followup with me in 2-3 months.      IMPORTANT INFORMATION     Call 911 for medical emergencies.  Our offices are generally open from Monday-Friday, 9 am - 5 pm.  If you need to get in touch with me, you may either call me and my team(number is below) or you can use [facility].  If a referral for a test, for CPAP, or for another specialist was made, and you have not heard about scheduling this within a week, please call scheduling at [phone]).  If you are unable to make your appointment for clinic or an  overnight study, kindly call the office at least 48 hours in advance to cancel and reschedule.  If you are on CPAP, please bring your device's card or the device to each clinic appointment.   There are no supporting services by either the sleep doctors or their staff on weekends and Holidays, or after 5 PM on weekdays.   If you have been asked to come to a sleep study, make sure you bring toiletries, a comfy pillow, and any nighttime medications that you may regularly take. Also be sure to eat dinner before you arrive. We generally do not provide meals.      PRESCRIPTIONS     We require 7 days advanced notice for prescription refills. If we do not receive the request in this time, we cannot guarantee that your medication will be refilled in time.      IMPORTANT PHONE NUMBERS      scheduling for medical [phone]   Sleep Medicine Clinic Fax: [phone]  Appointments (for Pediatric Sleep Clinic): [phone] - option 1  Appointments (for Adult Sleep Clinic): [phone] - option 2  Appointments (For Sleep Studies): [phone] - option 3  Financial Assistance Program: Call [phone] (For [facility] services: call [phone])  Website:  Financial Assistance  Behavioral Sleep Medicine: [phone]  Bariatric Surgery: [phone] ( Bariatric Surgery Website)   Sleep Surgery: [phone]  ENT (Otolaryngology): [phone]  Myofunctional Therapy (ENT): DONTAE Ward, Heather Washington, Prashant Guevara; [phone]   Roly Camacho; [phone]  Aniket Singer; [phone]  [facility]; [phone]  Corrie Kirkpatrick/[facility] [phone] (option 1)  Headache Clinic (Neurology): [phone]  Neurology: [phone]  Psychiatry: [phone]  Pulmonary Function Testing (PFT) Center: [phone]  Pulmonary Medicine: [phone]  [employer] ([employer]): [phone]  [employer] ([facility]): [phone]  Liam  "Medical ([facility]): [phone]      COMMON PROVIDERS WE REFER TO     For Weight Loss - Dr. Paulette Aragon - Call [phone]  For Sleep Surgery - Dr. Maximo Rushing - Call [phone]      OUR ADULT SLEEP MEDICINE TEAM   Please do not hesitate to call the office or sleep nurse with any questions between appointments:    Adult Sleep Nurses (Rose Lagunas, RN and Krissy Xiao RN):  For clinical questions and refilling prescriptions: [phone]  Email sleep diaries and other documents at: [email or web address]    My :  Nelly Sena   P: [phone]  F: [phone]      CONTACTING YOUR SLEEP MEDICINE PROVIDER     Send a message directly to your provider through \"My Chart\", which is the email service through your  Records Account: [email or web address]   Call [phone] and leave a message. One of the administrative assistants will forward the message to your sleep medicine provider through \"My Chart\" and/or email.     Your sleep medicine provider for this visit was: Ely Alejandra MD        "
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
## 2025-07-16 ENCOUNTER — OFFICE VISIT (OUTPATIENT)
Dept: DERMATOLOGY | Facility: CLINIC | Age: 50
End: 2025-07-16
Payer: COMMERCIAL

## 2025-07-16 ENCOUNTER — ALLIED HEALTH (OUTPATIENT)
Dept: INTEGRATIVE MEDICINE | Facility: CLINIC | Age: 50
End: 2025-07-16

## 2025-07-16 ENCOUNTER — APPOINTMENT (OUTPATIENT)
Dept: INTEGRATIVE MEDICINE | Facility: CLINIC | Age: 50
End: 2025-07-16
Payer: COMMERCIAL

## 2025-07-16 DIAGNOSIS — L21.9 SEBORRHEIC DERMATITIS: ICD-10-CM

## 2025-07-16 DIAGNOSIS — M25.512 CHRONIC LEFT SHOULDER PAIN: Primary | ICD-10-CM

## 2025-07-16 DIAGNOSIS — G89.29 CHRONIC LEFT SHOULDER PAIN: Primary | ICD-10-CM

## 2025-07-16 DIAGNOSIS — M54.2 NECK PAIN: ICD-10-CM

## 2025-07-16 DIAGNOSIS — L65.9 ALOPECIA: Primary | ICD-10-CM

## 2025-07-16 PROCEDURE — ACUGR GROUP ACUPUNCTURE: Performed by: ACUPUNCTURIST

## 2025-07-16 PROCEDURE — 99204 OFFICE O/P NEW MOD 45 MIN: CPT | Performed by: DERMATOLOGY

## 2025-07-16 RX ORDER — MINOXIDIL 5 %
1 SOLUTION, NON-ORAL TOPICAL DAILY
Qty: 180 ML | Refills: 11 | Status: SHIPPED | OUTPATIENT
Start: 2025-07-16

## 2025-07-16 RX ORDER — MINOXIDIL 5 %
1 SOLUTION, NON-ORAL TOPICAL DAILY
Qty: 180 ML | Refills: 11 | Status: SHIPPED | OUTPATIENT
Start: 2025-07-16 | End: 2025-07-16

## 2025-07-16 RX ORDER — KETOCONAZOLE 20 MG/ML
SHAMPOO, SUSPENSION TOPICAL WEEKLY
Qty: 120 ML | Refills: 11 | Status: SHIPPED | OUTPATIENT
Start: 2025-07-16

## 2025-07-16 NOTE — PROGRESS NOTES
Acupuncture Visit:     Subjective   Patient ID: Theodora Montez is a 49 y.o. female who presents for No chief complaint on file.  Seeking maintenance support for neck pain and frozen shoulder of left side        Session Information  Is this acupuncture treatment being billed to the patient's insurance company: No  Visit Type: Follow-up visit         Review of Systems         Provider reviewed plan for the acupuncture session, precautions and contraindications. Patient/guardian/hospital staff has given consent to treat with full understanding of what to expect during the session. Before acupuncture began, provider explained to the patient to communicate at any time if the procedure was causing discomfort past their tolerance level. Patient agreed to advise acupuncturist. The acupuncturist counseled the patient on the risks of acupuncture treatment including pain, infection, bleeding, and no relief of pain. The patient was positioned comfortably. There was no evidence of infection at the site of needle insertions.    Objective   Physical Exam         Treatment Plan  Treatment Goals: Pain management    Acupuncture Treatment  Needle Guage: 36 guage /.20/ Blue seirin  Body Points - Left: shoulder pt, sj 14, li 15  Body Points - Bilateral: gb 21, sp 6, gb 20, du 20,  Body Points - Right: si 16  Needle Count In: 11  Needle Count Out: 11  Needle Retention Time (min): 25 minutes  Total Face to Face Time (min): 25 minutes              Assessment/Plan

## 2025-07-16 NOTE — Clinical Note
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 Print rx provided to patient to assist with using [employer] for payment.  
Diffuse hair thinning and mild erythema of the scalp, no patches of alopecia, negative hair pull test  
Detail Level: Detailed

## 2025-07-16 NOTE — PROGRESS NOTES
Subjective     Theodora Montez is a 49 y.o. female who presents for the following: Psoriasis (Possible scalp psoriasis ? Hair thinning and falling out ). Hair has thinned significantly over last couple of years. Has had lab workup which is negative. It has been itchy. Washes hair once a week. Tried dandruff shampoo and didn't seem to make a difference, still had flakes.         Review of Systems:  No other skin or systemic complaints other than what is documented elsewhere in the note.    The following portions of the chart were reviewed this encounter and updated as appropriate:          Skin Cancer History  Biopsy Log Book  No skin cancers from Specimen Tracking.    Additional History      Specialty Problems          Dermatology Problems    Alopecia        Objective   Well appearing patient in no apparent distress; mood and affect are within normal limits.    A focused skin examination was performed. All findings within normal limits unless otherwise noted below.    Assessment/Plan   Skin Exam  1. 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 Print rx provided to patient to assist with using [employer] for payment.  - ketoconazole (NIZOral) 2 % shampoo - Scalp - Apply topically 1 (one) time per week.  This Visit  - minoxidiL 5 % solution - Apply 1 Application topically once daily.  2. SEBORRHEIC DERMATITIS      Daxa Costello MD  PGY4,  Dermatology    I saw and evaluated the patient. I personally obtained the key and critical portions of the history and physical exam or was physically present for key and critical portions performed by the resident/fellow. I reviewed the resident/fellow's documentation and discussed the patient with the resident/fellow. I agree with the resident/fellow's medical decision making as documented in the note.    René Magana MD PhD

## 2025-07-29 ENCOUNTER — APPOINTMENT (OUTPATIENT)
Dept: INTEGRATIVE MEDICINE | Facility: CLINIC | Age: 50
End: 2025-07-29
Payer: COMMERCIAL

## 2025-07-30 ENCOUNTER — APPOINTMENT (OUTPATIENT)
Dept: INTEGRATIVE MEDICINE | Facility: CLINIC | Age: 50
End: 2025-07-30
Payer: COMMERCIAL

## 2025-07-30 DIAGNOSIS — M54.2 NECK PAIN: Primary | ICD-10-CM

## 2025-07-30 DIAGNOSIS — M54.59 OTHER LOW BACK PAIN: ICD-10-CM

## 2025-07-30 PROCEDURE — ACUGR GROUP ACUPUNCTURE: Performed by: ACUPUNCTURIST

## 2025-07-30 PROCEDURE — RXMED WILLOW AMBULATORY MEDICATION CHARGE

## 2025-07-30 NOTE — PROGRESS NOTES
Acupuncture Visit:     Subjective   Patient ID: Theodora Montez is a 49 y.o. female who presents for No chief complaint on file.  Seeking support for chronic neck pain on left side.  Walked at [facility] and presenting with low back pain.        Session Information  Is this acupuncture treatment being billed to the patient's insurance company: No  Visit Type: Follow-up visit         Review of Systems         Provider reviewed plan for the acupuncture session, precautions and contraindications. Patient/guardian/hospital staff has given consent to treat with full understanding of what to expect during the session. Before acupuncture began, provider explained to the patient to communicate at any time if the procedure was causing discomfort past their tolerance level. Patient agreed to advise acupuncturist. The acupuncturist counseled the patient on the risks of acupuncture treatment including pain, infection, bleeding, and no relief of pain. The patient was positioned comfortably. There was no evidence of infection at the site of needle insertions.    Objective   Physical Exam         Treatment Plan  Treatment Goals: Pain management    Acupuncture Treatment  Patient Position: Seated and reclining  Needle Guage: 40 guage /.16/ Red seirin, 36 guage /.20/ Blue seirin  Body Points - Left: li 4, gb 41, trapx3  Body Points - Bilateral: k3  Body Points - Right: sj 5, lr 3  Needle Count In: 9  Needle Count Out: 9  Needle Retention Time (min): 25 minutes  Total Face to Face Time (min): 25 minutes              Assessment/Plan

## 2025-08-01 ENCOUNTER — PHARMACY VISIT (OUTPATIENT)
Dept: PHARMACY | Facility: CLINIC | Age: 50
End: 2025-08-01
Payer: COMMERCIAL

## 2025-08-01 ENCOUNTER — APPOINTMENT (OUTPATIENT)
Dept: PRIMARY CARE | Facility: CLINIC | Age: 50
End: 2025-08-01
Payer: COMMERCIAL

## 2025-08-01 VITALS — DIASTOLIC BLOOD PRESSURE: 98 MMHG | SYSTOLIC BLOOD PRESSURE: 140 MMHG

## 2025-08-01 DIAGNOSIS — M54.9 BACK PAIN, UNSPECIFIED BACK LOCATION, UNSPECIFIED BACK PAIN LATERALITY, UNSPECIFIED CHRONICITY: ICD-10-CM

## 2025-08-01 DIAGNOSIS — F32.A DEPRESSION, UNSPECIFIED DEPRESSION TYPE: ICD-10-CM

## 2025-08-01 DIAGNOSIS — M25.512 CHRONIC LEFT SHOULDER PAIN: ICD-10-CM

## 2025-08-01 DIAGNOSIS — E78.5 HYPERLIPIDEMIA, UNSPECIFIED HYPERLIPIDEMIA TYPE: ICD-10-CM

## 2025-08-01 DIAGNOSIS — G89.29 CHRONIC LEFT SHOULDER PAIN: ICD-10-CM

## 2025-08-01 DIAGNOSIS — E66.9 OBESITY, UNSPECIFIED CLASS, UNSPECIFIED OBESITY TYPE, UNSPECIFIED WHETHER SERIOUS COMORBIDITY PRESENT: ICD-10-CM

## 2025-08-01 DIAGNOSIS — G47.00 INSOMNIA, UNSPECIFIED TYPE: ICD-10-CM

## 2025-08-01 DIAGNOSIS — M62.838 MUSCLE SPASM: Primary | ICD-10-CM

## 2025-08-01 DIAGNOSIS — I10 PRIMARY HYPERTENSION: ICD-10-CM

## 2025-08-01 DIAGNOSIS — M54.2 CERVICAL PAIN: ICD-10-CM

## 2025-08-01 PROCEDURE — 3080F DIAST BP >= 90 MM HG: CPT | Performed by: INTERNAL MEDICINE

## 2025-08-01 PROCEDURE — 3077F SYST BP >= 140 MM HG: CPT | Performed by: INTERNAL MEDICINE

## 2025-08-01 PROCEDURE — 99215 OFFICE O/P EST HI 40 MIN: CPT | Performed by: INTERNAL MEDICINE

## 2025-08-01 RX ORDER — TOPIRAMATE 50 MG/1
100 TABLET, FILM COATED ORAL DAILY
Qty: 180 TABLET | Refills: 1 | Status: SHIPPED | OUTPATIENT
Start: 2025-08-01 | End: 2025-10-30

## 2025-08-01 RX ORDER — CYCLOBENZAPRINE HCL 5 MG
10 TABLET ORAL 3 TIMES DAILY PRN
Qty: 270 TABLET | Refills: 1 | Status: SHIPPED | OUTPATIENT
Start: 2025-08-01

## 2025-08-01 RX ORDER — BUPROPION HYDROCHLORIDE 150 MG/1
150 TABLET ORAL
Qty: 90 TABLET | Refills: 1 | Status: SHIPPED | OUTPATIENT
Start: 2025-08-01

## 2025-08-01 RX ORDER — BUPROPION HYDROCHLORIDE 300 MG/1
300 TABLET ORAL DAILY
Qty: 90 TABLET | Refills: 1 | Status: SHIPPED | OUTPATIENT
Start: 2025-08-01

## 2025-08-01 RX ORDER — AMLODIPINE BESYLATE 2.5 MG/1
2.5 TABLET ORAL DAILY
Qty: 90 TABLET | Refills: 1 | Status: SHIPPED | OUTPATIENT
Start: 2025-08-01

## 2025-08-01 RX ORDER — ATORVASTATIN CALCIUM 10 MG/1
10 TABLET, FILM COATED ORAL DAILY
Qty: 90 TABLET | Refills: 1 | Status: SHIPPED | OUTPATIENT
Start: 2025-08-01

## 2025-08-01 RX ORDER — SEMAGLUTIDE 2.68 MG/ML
2 INJECTION, SOLUTION SUBCUTANEOUS
Qty: 9 ML | Refills: 2 | Status: SHIPPED | OUTPATIENT
Start: 2025-08-01

## 2025-08-01 RX ORDER — AMITRIPTYLINE HYDROCHLORIDE 10 MG/1
10 TABLET, FILM COATED ORAL NIGHTLY
Qty: 90 TABLET | Refills: 1 | Status: SHIPPED | OUTPATIENT
Start: 2025-08-01 | End: 2025-10-30
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
## 2025-08-04 ENCOUNTER — APPOINTMENT (OUTPATIENT)
Dept: INTEGRATIVE MEDICINE | Facility: CLINIC | Age: 50
End: 2025-08-04
Payer: COMMERCIAL

## 2025-08-04 DIAGNOSIS — M54.59 OTHER LOW BACK PAIN: Primary | ICD-10-CM

## 2025-08-04 DIAGNOSIS — M54.2 NECK PAIN: ICD-10-CM

## 2025-08-04 PROCEDURE — ACUGR GROUP ACUPUNCTURE: Performed by: ACUPUNCTURIST

## 2025-08-04 NOTE — PROGRESS NOTES
Acupuncture Visit:     Subjective   Patient ID: Theodora Montez is a 49 y.o. female who presents for No chief complaint on file.  Seeking ongoing support for low back and neck pain.  Low back pain escalated with PMS, onset of menses reduced it.  Menses clotted.          Session Information  Is this acupuncture treatment being billed to the patient's insurance company: No  Visit Type: Follow-up visit         Review of Systems         Provider reviewed plan for the acupuncture session, precautions and contraindications. Patient/guardian/hospital staff has given consent to treat with full understanding of what to expect during the session. Before acupuncture began, provider explained to the patient to communicate at any time if the procedure was causing discomfort past their tolerance level. Patient agreed to advise acupuncturist. The acupuncturist counseled the patient on the risks of acupuncture treatment including pain, infection, bleeding, and no relief of pain. The patient was positioned comfortably. There was no evidence of infection at the site of needle insertions.    Objective   Physical Exam              Acupuncture Treatment  Patient Position: Seated and reclining  Needle Guage: 40 guage /.16/ Red seirin, 36 guage /.20/ Blue seirin, 32 guage /.25/ Purple seirin  Body Points - Left: lr 3  Body Points - Bilateral: sp 3.2, 9, st 40  Body Points - Right: trapx2, leroy 3, li4,  Needle Count In: 11  Needle Count Out: 11  Needle Retention Time (min): 25 minutes  Total Face to Face Time (min): 25 minutes              Assessment/Plan

## 2025-08-05 ENCOUNTER — APPOINTMENT (OUTPATIENT)
Dept: INTEGRATIVE MEDICINE | Facility: CLINIC | Age: 50
End: 2025-08-05
Payer: COMMERCIAL

## 2025-08-11 ENCOUNTER — APPOINTMENT (OUTPATIENT)
Dept: INTEGRATIVE MEDICINE | Facility: CLINIC | Age: 50
End: 2025-08-11
Payer: COMMERCIAL

## 2025-08-13 ENCOUNTER — APPOINTMENT (OUTPATIENT)
Dept: INTEGRATIVE MEDICINE | Facility: CLINIC | Age: 50
End: 2025-08-13
Payer: COMMERCIAL

## 2025-08-13 DIAGNOSIS — M54.59 OTHER LOW BACK PAIN: ICD-10-CM

## 2025-08-13 DIAGNOSIS — M54.2 NECK PAIN: Primary | ICD-10-CM

## 2025-08-13 PROCEDURE — ACUGR GROUP ACUPUNCTURE: Performed by: ACUPUNCTURIST

## 2025-08-18 ENCOUNTER — APPOINTMENT (OUTPATIENT)
Dept: INTEGRATIVE MEDICINE | Facility: CLINIC | Age: 50
End: 2025-08-18
Payer: COMMERCIAL

## 2025-08-18 DIAGNOSIS — M54.2 NECK PAIN: ICD-10-CM

## 2025-08-18 DIAGNOSIS — R51.9 HEADACHE: Primary | ICD-10-CM

## 2025-08-18 PROCEDURE — ACUGR GROUP ACUPUNCTURE: Performed by: ACUPUNCTURIST

## 2025-08-19 ENCOUNTER — APPOINTMENT (OUTPATIENT)
Dept: NUTRITION | Facility: HOSPITAL | Age: 50
End: 2025-08-19
Payer: COMMERCIAL

## 2025-08-19 VITALS — WEIGHT: 218 LBS | HEIGHT: 63 IN | BODY MASS INDEX: 38.62 KG/M2

## 2025-08-19 DIAGNOSIS — E66.812 CLASS 2 OBESITY WITH BODY MASS INDEX (BMI) OF 38.0 TO 38.9 IN ADULT, UNSPECIFIED OBESITY TYPE, UNSPECIFIED WHETHER SERIOUS COMORBIDITY PRESENT: Primary | ICD-10-CM

## 2025-08-19 PROCEDURE — 97802 MEDICAL NUTRITION INDIV IN: CPT

## 2025-08-25 ENCOUNTER — APPOINTMENT (OUTPATIENT)
Dept: INTEGRATIVE MEDICINE | Facility: CLINIC | Age: 50
End: 2025-08-25
Payer: COMMERCIAL

## 2025-08-25 DIAGNOSIS — M54.2 NECK PAIN: ICD-10-CM

## 2025-08-25 DIAGNOSIS — R51.9 HEADACHE: Primary | ICD-10-CM

## 2025-08-25 PROCEDURE — ACUGR GROUP ACUPUNCTURE: Performed by: ACUPUNCTURIST

## 2025-08-29 PROCEDURE — RXMED WILLOW AMBULATORY MEDICATION CHARGE

## 2025-08-30 ENCOUNTER — PHARMACY VISIT (OUTPATIENT)
Dept: PHARMACY | Facility: CLINIC | Age: 50
End: 2025-08-30
Payer: COMMERCIAL

## 2025-09-15 ENCOUNTER — APPOINTMENT (OUTPATIENT)
Dept: INTEGRATIVE MEDICINE | Facility: CLINIC | Age: 50
End: 2025-09-15
Payer: COMMERCIAL

## 2025-09-15 ENCOUNTER — APPOINTMENT (OUTPATIENT)
Dept: INTEGRATIVE MEDICINE | Facility: CLINIC | Age: 50
End: 2025-09-15

## 2025-09-22 ENCOUNTER — APPOINTMENT (OUTPATIENT)
Dept: INTEGRATIVE MEDICINE | Facility: CLINIC | Age: 50
End: 2025-09-22
Payer: COMMERCIAL

## 2025-09-30 ENCOUNTER — APPOINTMENT (OUTPATIENT)
Dept: ENDOCRINOLOGY | Facility: CLINIC | Age: 50
End: 2025-09-30
Payer: COMMERCIAL

## 2025-10-01 ENCOUNTER — APPOINTMENT (OUTPATIENT)
Dept: INTEGRATIVE MEDICINE | Facility: CLINIC | Age: 50
End: 2025-10-01
Payer: COMMERCIAL

## 2025-10-15 ENCOUNTER — APPOINTMENT (OUTPATIENT)
Dept: INTEGRATIVE MEDICINE | Facility: CLINIC | Age: 50
End: 2025-10-15
Payer: COMMERCIAL

## 2025-11-04 ENCOUNTER — APPOINTMENT (OUTPATIENT)
Dept: PRIMARY CARE | Facility: CLINIC | Age: 50
End: 2025-11-04
Payer: COMMERCIAL

## 2026-03-02 ENCOUNTER — APPOINTMENT (OUTPATIENT)
Dept: PRIMARY CARE | Facility: CLINIC | Age: 51
End: 2026-03-02
Payer: COMMERCIAL